# Patient Record
Sex: MALE | Race: WHITE | NOT HISPANIC OR LATINO | Employment: OTHER | URBAN - METROPOLITAN AREA
[De-identification: names, ages, dates, MRNs, and addresses within clinical notes are randomized per-mention and may not be internally consistent; named-entity substitution may affect disease eponyms.]

---

## 2023-09-05 PROBLEM — M79.606 PAIN OF LOWER EXTREMITY: Status: ACTIVE | Noted: 2023-09-05

## 2023-09-05 PROBLEM — G47.30 SLEEP APNEA: Status: ACTIVE | Noted: 2023-09-05

## 2023-09-05 PROBLEM — R60.9 EDEMA: Status: ACTIVE | Noted: 2023-09-05

## 2023-09-05 PROBLEM — R60.0 LOWER LEG EDEMA: Status: ACTIVE | Noted: 2023-09-05

## 2023-09-05 PROBLEM — I10 HYPERTENSION: Status: ACTIVE | Noted: 2023-09-05

## 2023-09-05 PROBLEM — I73.9 PERIPHERAL VASCULAR DISEASE (CMS-HCC): Status: ACTIVE | Noted: 2023-09-05

## 2023-09-05 PROBLEM — I25.10 CORONARY ARTERY DISEASE: Status: ACTIVE | Noted: 2023-09-05

## 2023-09-05 PROBLEM — E78.5 HYPERLIPIDEMIA: Status: ACTIVE | Noted: 2023-09-05

## 2023-09-05 PROBLEM — L03.90 CELLULITIS: Status: ACTIVE | Noted: 2023-09-05

## 2023-09-05 PROBLEM — M19.90 DEGENERATIVE JOINT DISEASE: Status: ACTIVE | Noted: 2023-09-05

## 2023-09-05 PROBLEM — E11.9 DIABETES MELLITUS WITHOUT COMPLICATION (MULTI): Status: ACTIVE | Noted: 2023-09-05

## 2023-09-05 PROBLEM — E11.21 DIABETIC NEPHROPATHY (MULTI): Status: ACTIVE | Noted: 2023-09-05

## 2023-09-05 PROBLEM — E53.8 VITAMIN B12 DEFICIENCY: Status: ACTIVE | Noted: 2023-09-05

## 2023-09-05 PROBLEM — E11.40 DIABETIC NEUROPATHY (MULTI): Status: ACTIVE | Noted: 2023-09-05

## 2023-09-05 PROBLEM — E86.0 DEHYDRATION: Status: ACTIVE | Noted: 2023-09-05

## 2023-09-05 PROBLEM — I63.9 STROKE (MULTI): Status: ACTIVE | Noted: 2023-09-05

## 2023-09-05 PROBLEM — D75.1 POLYCYTHEMIA: Status: ACTIVE | Noted: 2023-09-05

## 2023-09-05 PROBLEM — M10.9 GOUT: Status: ACTIVE | Noted: 2023-09-05

## 2023-09-05 PROBLEM — N40.0 BENIGN PROSTATIC HYPERPLASIA: Status: ACTIVE | Noted: 2023-09-05

## 2023-09-05 PROBLEM — E87.1 HYPONATREMIA: Status: ACTIVE | Noted: 2023-09-05

## 2023-09-05 PROBLEM — F41.9 ANXIETY: Status: ACTIVE | Noted: 2023-09-05

## 2023-09-05 PROBLEM — R41.0 DELIRIUM: Status: ACTIVE | Noted: 2023-09-05

## 2023-09-05 PROBLEM — R40.1 CLOUDED CONSCIOUSNESS: Status: ACTIVE | Noted: 2019-08-02

## 2023-09-05 PROBLEM — M79.89 LEG SWELLING: Status: ACTIVE | Noted: 2023-09-05

## 2023-09-05 PROBLEM — L03.119 CELLULITIS OF LOWER LEG: Status: ACTIVE | Noted: 2023-09-05

## 2023-09-05 PROBLEM — A87.9 VIRAL MENINGITIS (HHS-HCC): Status: ACTIVE | Noted: 2023-09-05

## 2023-09-05 PROBLEM — F41.8 MIXED ANXIETY AND DEPRESSIVE DISORDER: Status: ACTIVE | Noted: 2023-09-05

## 2023-09-05 PROBLEM — E87.20 LACTIC ACIDOSIS: Status: ACTIVE | Noted: 2023-09-05

## 2023-09-05 RX ORDER — GABAPENTIN 300 MG/1
300 CAPSULE ORAL DAILY
COMMUNITY
End: 2023-09-13 | Stop reason: DRUGHIGH

## 2023-09-05 RX ORDER — ALLOPURINOL 300 MG/1
300 TABLET ORAL DAILY
COMMUNITY
End: 2023-12-21 | Stop reason: SDUPTHER

## 2023-09-05 RX ORDER — INSULIN HUMAN 100 [IU]/ML
INJECTION, SUSPENSION SUBCUTANEOUS
COMMUNITY
Start: 2014-12-17

## 2023-09-05 RX ORDER — LISINOPRIL 10 MG/1
10 TABLET ORAL DAILY
COMMUNITY
Start: 2018-12-26 | End: 2023-09-13 | Stop reason: DRUGHIGH

## 2023-09-05 RX ORDER — LANOLIN ALCOHOL/MO/W.PET/CERES
1 CREAM (GRAM) TOPICAL DAILY
COMMUNITY
Start: 2019-12-04 | End: 2023-12-21 | Stop reason: ALTCHOICE

## 2023-09-05 RX ORDER — FUROSEMIDE 20 MG/1
20 TABLET ORAL DAILY
COMMUNITY
Start: 2021-02-01 | End: 2023-12-21 | Stop reason: SDUPTHER

## 2023-09-05 RX ORDER — CLOPIDOGREL BISULFATE 75 MG/1
75 TABLET ORAL DAILY
COMMUNITY

## 2023-09-05 RX ORDER — ASPIRIN 325 MG
325 TABLET ORAL DAILY
COMMUNITY
End: 2023-09-13 | Stop reason: ALTCHOICE

## 2023-09-05 RX ORDER — AMMONIUM LACTATE 12 G/100G
1 LOTION TOPICAL DAILY
COMMUNITY
End: 2023-09-13 | Stop reason: ALTCHOICE

## 2023-09-05 RX ORDER — GABAPENTIN 600 MG/1
600 TABLET ORAL EVERY 8 HOURS
COMMUNITY
End: 2023-12-21 | Stop reason: SDUPTHER

## 2023-09-05 RX ORDER — LISINOPRIL 20 MG/1
20 TABLET ORAL DAILY
COMMUNITY
Start: 2018-12-26 | End: 2023-12-21 | Stop reason: SDUPTHER

## 2023-09-05 RX ORDER — SIMVASTATIN 40 MG/1
40 TABLET, FILM COATED ORAL DAILY
COMMUNITY
Start: 2013-04-17 | End: 2023-12-21 | Stop reason: SDUPTHER

## 2023-09-05 RX ORDER — IBUPROFEN 200 MG
CAPSULE ORAL
COMMUNITY
Start: 2020-12-28 | End: 2023-12-21 | Stop reason: SDUPTHER

## 2023-12-15 ENCOUNTER — LAB (OUTPATIENT)
Dept: LAB | Facility: LAB | Age: 83
End: 2023-12-15
Payer: MEDICARE

## 2023-12-15 DIAGNOSIS — I10 ESSENTIAL (PRIMARY) HYPERTENSION: ICD-10-CM

## 2023-12-15 DIAGNOSIS — E78.5 HYPERLIPIDEMIA, UNSPECIFIED: ICD-10-CM

## 2023-12-15 DIAGNOSIS — M10.9 GOUT, UNSPECIFIED: ICD-10-CM

## 2023-12-15 DIAGNOSIS — E11.40 TYPE 2 DIABETES MELLITUS WITH DIABETIC NEUROPATHY, UNSPECIFIED (MULTI): Primary | ICD-10-CM

## 2023-12-15 LAB
ALBUMIN SERPL-MCNC: 4 G/DL (ref 3.5–5)
ALP BLD-CCNC: 88 U/L (ref 35–125)
ALT SERPL-CCNC: 13 U/L (ref 5–40)
ANION GAP SERPL CALC-SCNC: 12 MMOL/L
AST SERPL-CCNC: 16 U/L (ref 5–40)
BILIRUB SERPL-MCNC: 0.6 MG/DL (ref 0.1–1.2)
BUN SERPL-MCNC: 13 MG/DL (ref 8–25)
CALCIUM SERPL-MCNC: 8.8 MG/DL (ref 8.5–10.4)
CHLORIDE SERPL-SCNC: 100 MMOL/L (ref 97–107)
CHOLEST SERPL-MCNC: 167 MG/DL (ref 133–200)
CHOLEST/HDLC SERPL: 4.8 {RATIO}
CO2 SERPL-SCNC: 28 MMOL/L (ref 24–31)
CREAT SERPL-MCNC: 1.3 MG/DL (ref 0.4–1.6)
ERYTHROCYTE [DISTWIDTH] IN BLOOD BY AUTOMATED COUNT: 13.3 % (ref 11.5–14.5)
EST. AVERAGE GLUCOSE BLD GHB EST-MCNC: 180 MG/DL
GFR SERPL CREATININE-BSD FRML MDRD: 55 ML/MIN/1.73M*2
GLUCOSE SERPL-MCNC: 188 MG/DL (ref 65–99)
HBA1C MFR BLD: 7.9 %
HCT VFR BLD AUTO: 56 % (ref 41–52)
HDLC SERPL-MCNC: 35 MG/DL
HGB BLD-MCNC: 18.1 G/DL (ref 13.5–17.5)
LDLC SERPL CALC-MCNC: 100 MG/DL (ref 65–130)
MCH RBC QN AUTO: 30.7 PG (ref 26–34)
MCHC RBC AUTO-ENTMCNC: 32.3 G/DL (ref 32–36)
MCV RBC AUTO: 95 FL (ref 80–100)
NRBC BLD-RTO: 0 /100 WBCS (ref 0–0)
PLATELET # BLD AUTO: 185 X10*3/UL (ref 150–450)
POTASSIUM SERPL-SCNC: 4.7 MMOL/L (ref 3.4–5.1)
PROT SERPL-MCNC: 6.4 G/DL (ref 5.9–7.9)
RBC # BLD AUTO: 5.89 X10*6/UL (ref 4.5–5.9)
SODIUM SERPL-SCNC: 140 MMOL/L (ref 133–145)
TRIGL SERPL-MCNC: 158 MG/DL (ref 40–150)
URATE SERPL-MCNC: 4.4 MG/DL (ref 3.6–7.7)
WBC # BLD AUTO: 8.5 X10*3/UL (ref 4.4–11.3)

## 2023-12-15 PROCEDURE — 84550 ASSAY OF BLOOD/URIC ACID: CPT

## 2023-12-15 PROCEDURE — 80053 COMPREHEN METABOLIC PANEL: CPT

## 2023-12-15 PROCEDURE — 36415 COLL VENOUS BLD VENIPUNCTURE: CPT

## 2023-12-15 PROCEDURE — 80061 LIPID PANEL: CPT

## 2023-12-15 PROCEDURE — 83036 HEMOGLOBIN GLYCOSYLATED A1C: CPT

## 2023-12-15 PROCEDURE — 85027 COMPLETE CBC AUTOMATED: CPT

## 2023-12-21 ENCOUNTER — OFFICE VISIT (OUTPATIENT)
Dept: PRIMARY CARE | Facility: CLINIC | Age: 83
End: 2023-12-21
Payer: MEDICARE

## 2023-12-21 VITALS
DIASTOLIC BLOOD PRESSURE: 78 MMHG | BODY MASS INDEX: 40.97 KG/M2 | TEMPERATURE: 96.6 F | OXYGEN SATURATION: 97 % | SYSTOLIC BLOOD PRESSURE: 118 MMHG | HEART RATE: 70 BPM | WEIGHT: 250 LBS

## 2023-12-21 DIAGNOSIS — Z86.2 HISTORY OF ANEMIA: ICD-10-CM

## 2023-12-21 DIAGNOSIS — I10 PRIMARY HYPERTENSION: ICD-10-CM

## 2023-12-21 DIAGNOSIS — Z01.89 ENCOUNTER FOR ROUTINE LABORATORY TESTING: ICD-10-CM

## 2023-12-21 DIAGNOSIS — M10.9 GOUT, UNSPECIFIED CAUSE, UNSPECIFIED CHRONICITY, UNSPECIFIED SITE: ICD-10-CM

## 2023-12-21 DIAGNOSIS — M79.89 LEG SWELLING: ICD-10-CM

## 2023-12-21 DIAGNOSIS — E11.39 TYPE 2 DIABETES MELLITUS WITH OTHER OPHTHALMIC COMPLICATION, WITH LONG-TERM CURRENT USE OF INSULIN (MULTI): Primary | ICD-10-CM

## 2023-12-21 DIAGNOSIS — G62.9 NEUROPATHY: ICD-10-CM

## 2023-12-21 DIAGNOSIS — R73.9 HYPERGLYCEMIA: ICD-10-CM

## 2023-12-21 DIAGNOSIS — Z79.4 TYPE 2 DIABETES MELLITUS WITH OTHER OPHTHALMIC COMPLICATION, WITH LONG-TERM CURRENT USE OF INSULIN (MULTI): Primary | ICD-10-CM

## 2023-12-21 DIAGNOSIS — E78.2 MIXED HYPERLIPIDEMIA: ICD-10-CM

## 2023-12-21 PROCEDURE — 3074F SYST BP LT 130 MM HG: CPT | Performed by: NURSE PRACTITIONER

## 2023-12-21 PROCEDURE — 99214 OFFICE O/P EST MOD 30 MIN: CPT | Performed by: NURSE PRACTITIONER

## 2023-12-21 PROCEDURE — 1126F AMNT PAIN NOTED NONE PRSNT: CPT | Performed by: NURSE PRACTITIONER

## 2023-12-21 PROCEDURE — 1159F MED LIST DOCD IN RCRD: CPT | Performed by: NURSE PRACTITIONER

## 2023-12-21 PROCEDURE — 3078F DIAST BP <80 MM HG: CPT | Performed by: NURSE PRACTITIONER

## 2023-12-21 PROCEDURE — 1036F TOBACCO NON-USER: CPT | Performed by: NURSE PRACTITIONER

## 2023-12-21 RX ORDER — IBUPROFEN 200 MG
300 CAPSULE ORAL 3 TIMES DAILY
Qty: 300 EACH | Refills: 3 | Status: SHIPPED | OUTPATIENT
Start: 2023-12-21 | End: 2024-12-20

## 2023-12-21 RX ORDER — LISINOPRIL 20 MG/1
20 TABLET ORAL DAILY
Qty: 90 TABLET | Refills: 3 | Status: SHIPPED | OUTPATIENT
Start: 2023-12-21 | End: 2024-12-20

## 2023-12-21 RX ORDER — ALLOPURINOL 300 MG/1
300 TABLET ORAL DAILY
Qty: 90 TABLET | Refills: 3 | Status: SHIPPED | OUTPATIENT
Start: 2023-12-21 | End: 2024-12-20

## 2023-12-21 RX ORDER — SIMVASTATIN 40 MG/1
40 TABLET, FILM COATED ORAL NIGHTLY
Qty: 90 TABLET | Refills: 3 | Status: SHIPPED | OUTPATIENT
Start: 2023-12-21 | End: 2024-12-20

## 2023-12-21 RX ORDER — GABAPENTIN 600 MG/1
600 TABLET ORAL EVERY 8 HOURS
Qty: 270 TABLET | Refills: 3 | Status: SHIPPED | OUTPATIENT
Start: 2023-12-21 | End: 2024-12-20

## 2023-12-21 RX ORDER — FUROSEMIDE 20 MG/1
20 TABLET ORAL DAILY
Qty: 90 TABLET | Refills: 3 | Status: SHIPPED | OUTPATIENT
Start: 2023-12-21 | End: 2024-12-20

## 2023-12-21 ASSESSMENT — ENCOUNTER SYMPTOMS
NAUSEA: 0
OCCASIONAL FEELINGS OF UNSTEADINESS: 0
FEVER: 0
DIAPHORESIS: 0
NUMBNESS: 1
SHORTNESS OF BREATH: 0
POLYPHAGIA: 0
DEPRESSION: 0
CHEST TIGHTNESS: 0
COUGH: 0
APPETITE CHANGE: 0
CHILLS: 0
VOMITING: 0
BLOOD IN STOOL: 0
LOSS OF SENSATION IN FEET: 0
POLYDIPSIA: 0
ABDOMINAL PAIN: 0
FATIGUE: 0
PALPITATIONS: 0

## 2023-12-21 ASSESSMENT — PAIN SCALES - GENERAL: PAINLEVEL: 0-NO PAIN

## 2023-12-21 ASSESSMENT — PATIENT HEALTH QUESTIONNAIRE - PHQ9
1. LITTLE INTEREST OR PLEASURE IN DOING THINGS: NOT AT ALL
SUM OF ALL RESPONSES TO PHQ9 QUESTIONS 1 AND 2: 0
2. FEELING DOWN, DEPRESSED OR HOPELESS: NOT AT ALL

## 2023-12-21 NOTE — PROGRESS NOTES
[facility]: [facility]  PROGRESS NOTE      Kyle Conteh is a 83 y.o. male that is presenting today for 6 month diabetic follow up.    Assessment/Plan   Diagnoses and all orders for this visit:  Type 2 diabetes mellitus with other ophthalmic complication, with long-term current use of insulin (CMS/[facility])  -     blood sugar diagnostic (Blood Glucose Test) strip; 300 strips by subdermal route 3 times a day. One Touch Verio Test Strips DX: E11.9 uses insulin test 3x/day for 90 days  -     Albumin, urine, random; Future  Primary hypertension  -     lisinopril 20 mg tablet; Take 1 tablet (20 mg) by mouth once daily.  Mixed hyperlipidemia  -     simvastatin (Zocor) 40 mg tablet; Take 1 tablet (40 mg) by mouth once daily at bedtime.  -     Lipid Panel; Future  Gout, unspecified cause, unspecified chronicity, unspecified site  -     allopurinol (Zyloprim) 300 mg tablet; Take 1 tablet (300 mg) by mouth once daily.  -     Uric acid; Future  Neuropathy  -     gabapentin (Neurontin) 600 mg tablet; Take 1 tablet (600 mg) by mouth every 8 hours.  Leg swelling  -     furosemide (Lasix) 20 mg tablet; Take 1 tablet (20 mg) by mouth once daily.  Encounter for routine laboratory testing  -     CBC and Auto Differential; Future  -     Comprehensive Metabolic Panel; Future  -     Lipid Panel; Future  -     Hemoglobin A1C; Future  History of anemia  -     CBC and Auto Differential; Future  Hyperglycemia  -     Hemoglobin A1C; Future  Other orders  -     Follow Up In Primary Care - Medicare Annual; Future  Subjective   Subjective  Kyle Conteh is an 83 y.o. male who presents for follow up of diabetes. Current symptoms include: none. Patient denies foot ulcerations, hyperglycemia, hypoglycemia , increased appetite, nausea, polydipsia, polyuria, vomiting, and weight loss. Evaluation to date has included: fasting blood sugar, fasting lipid panel, hemoglobin A1C, and microalbuminuria. Home sugars: BGs are running   consistent with Hgb A1C. Current treatments: no recent interventions. Last dilated eye exam - up to date.    [unfilled]     Objective  [unfilled]     Laboratory:  Lab Results       Component                Value               Date                       HGBA1C                   7.9 (H)             12/15/2023              Assessment/Plan  Diabetes mellitus Type II, under fair control.  Addressed ADA diet.  Encouraged aerobic exercise.  Discussed foot care.  Reminded to get yearly retinal exam.  Continued insulin: no change to current dose    Subjective  Kyle Conteh is here for follow-up of his hypertension. Home blood pressure readings: not doing. Salt intake and diet: salt not added to cooking.  Associated signs and symptoms: none. Patient denies: chest pain, dyspnea, headache, neck aches, orthopnea, palpitations, and peripheral edema. Use of agents associated with hypertension: none. Medication compliance: taking as prescribed.    Objective  [unfilled]    Lab Review   Lab on 12/15/2023  Hemoglobin A1C            Value: 7.9(%) (H)         Dt: 12/15/2023  Estimated Average Glucose Value: 180(mg/dL)         Dt: 12/15/2023  WBC                       Value: 8.5(x10*3/uL)      Dt: 12/15/2023  nRBC                      Value: 0.0(/100 WBCs)     Dt: 12/15/2023  RBC                       Value: 5.89(x10*6/uL)     Dt: 12/15/2023  Hemoglobin                Value: 18.1(g/dL) (H)     Dt: 12/15/2023  Hematocrit                Value: 56.0(%) (H)        Dt: 12/15/2023  MCV                       Value: 95(fL)             Dt: 12/15/2023  MCH                       Value: 30.7(pg)           Dt: 12/15/2023  MCHC                      Value: 32.3(g/dL)         Dt: 12/15/2023  RDW                       Value: 13.3(%)            Dt: 12/15/2023  Platelets                 Value: 185(x10*3/uL)      Dt: 12/15/2023  Glucose                   Value: 188(mg/dL) (H)     Dt: 12/15/2023  Sodium                    Value: 140(mmol/L)        Dt:  12/15/2023  Potassium                 Value: 4.7(mmol/L)        Dt: 12/15/2023  Chloride                  Value: 100(mmol/L)        Dt: 12/15/2023  Bicarbonate               Value: 28(mmol/L)         Dt: 12/15/2023  Urea Nitrogen             Value: 13(mg/dL)          Dt: 12/15/2023  Creatinine                Value: 1.30(mg/dL)        Dt: 12/15/2023  eGFR                      Value: 55(mL/min/1.73m*2) (L) Dt: 12/15/2023  Calcium                   Value: 8.8(mg/dL)         Dt: 12/15/2023  Albumin                   Value: 4.0(g/dL)          Dt: 12/15/2023  Alkaline Phosphatase      Value: 88(U/L)            Dt: 12/15/2023  Total Protein             Value: 6.4(g/dL)          Dt: 12/15/2023  AST                       Value: 16(U/L)            Dt: 12/15/2023  Bilirubin, Total          Value: 0.6(mg/dL)         Dt: 12/15/2023  ALT                       Value: 13(U/L)            Dt: 12/15/2023  Anion Gap                 Value: 12(mmol/L)         Dt: 12/15/2023  Cholesterol               Value: 167(mg/dL)         Dt: 12/15/2023  HDL-Cholesterol           Value: 35.0(mg/dL) (L)    Dt: 12/15/2023  Cholesterol/HDL Ratio     Value: 4.8                Dt: 12/15/2023  LDL Calculated            Value: 100(mg/dL)         Dt: 12/15/2023  Triglycerides             Value: 158(mg/dL) (H)     Dt: 12/15/2023  Uric Acid                 Value: 4.4(mg/dL)         Dt: 12/15/2023  ------------    Assessment/Plan  Hypertension, normal blood pressure.     Medication: no change.  Dietary sodium restriction.  Regular aerobic exercise.  Follow up: 6 months and as needed.    Shannan  Kyle Conteh is here for follow up of dyslipidemia. Compliance with treatment has been good. The patient exercises never. Patient denies muscle pain associated with his medications.    [unfilled]    Objective  [unfilled]    Lab Review  Lab Results       Component                Value               Date                       CHOL                     167                  12/15/2023                 CHOL                     139                 06/12/2023                 CHOL                     139                 11/28/2022                 CHOL                     143                 05/10/2022                 TRIG                     158 (H)             12/15/2023                 TRIG                     123                 06/12/2023                 TRIG                     143                 11/28/2022                 TRIG                     147                 05/10/2022                 HDL                      35.0 (L)            12/15/2023                 HDL                      30 (L)              06/12/2023                 HDL                      32 (L)              11/28/2022                 HDL                      34 (L)              05/10/2022               Assessment/Plan  Dyslipidemia under good control.     1. Continue dietary measures.   2. Continue regular exercise.   3. Lipid-lowering medications: Simvastatin 40 mg QHS.    Shannan Conteh is a 83 y.o. male who presents today in follow-up for gout. The patient reports no acute gout attacks since last clinic visit. Attacks occur primarily in the left first MTP joint. Patient reports his chronic pain is resolved, his joint stiffness is unchanged and his joint swelling is stable. Limitations on activities include none.     Objective  /78 (BP Location: Right arm, Patient Position: Sitting, BP Cuff Size: Adult)   Pulse 70   Temp 35.9 °C (96.6 °F) (Temporal)   Wt 113 kg (250 lb)   SpO2 97%   BMI 40.97 kg/m²   General: alert and oriented, in no acute distress  Joint Findings: no joint abnormalities identified.  Skin findings: no tophi or subcutaneous nodules noted  Lab Results       Component                Value               Date                       URICACID                 4.4                 12/15/2023              Assessment/Plan  Diagnoses and associated orders for this visit:    · Type 2  diabetes mellitus with other ophthalmic complication, with long-term current use of insulin (CMS/[facility])    · Primary hypertension    · Mixed hyperlipidemia    · Gout, unspecified cause, unspecified chronicity, unspecified site                Review of Systems   Constitutional:  Negative for appetite change, chills, diaphoresis, fatigue and fever.   Respiratory:  Negative for cough, chest tightness and shortness of breath.    Cardiovascular:  Negative for chest pain, palpitations and leg swelling.   Gastrointestinal:  Negative for abdominal pain, blood in stool, nausea and vomiting.   Endocrine: Negative for cold intolerance, heat intolerance, polydipsia, polyphagia and polyuria.   Neurological:  Positive for numbness (bilateral feet).      Objective   Vitals:    12/21/23 0915   BP: 118/78   Pulse: 70   Temp: 35.9 °C (96.6 °F)   SpO2: 97%      Body mass index is 40.97 kg/m².  Physical Exam  Constitutional:       General: He is not in acute distress.  Neck:      Vascular: No carotid bruit.   Cardiovascular:      Rate and Rhythm: Normal rate and regular rhythm.      Heart sounds: Normal heart sounds.   Pulmonary:      Effort: Pulmonary effort is normal.      Breath sounds: Normal breath sounds.   Musculoskeletal:         General: No swelling.   Neurological:      Mental Status: He is alert. Mental status is at baseline.   Psychiatric:         Mood and Affect: Mood normal.       Diagnostic Results   Lab Results   Component Value Date    GLUCOSE 188 (H) 12/15/2023    CALCIUM 8.8 12/15/2023     12/15/2023    K 4.7 12/15/2023    CO2 28 12/15/2023     12/15/2023    BUN 13 12/15/2023    CREATININE 1.30 12/15/2023     Lab Results   Component Value Date    ALT 13 12/15/2023    AST 16 12/15/2023    ALKPHOS 88 12/15/2023    BILITOT 0.6 12/15/2023     Lab Results   Component Value Date    WBC 8.5 12/15/2023    HGB 18.1 (H) 12/15/2023    HCT 56.0 (H) 12/15/2023    MCV 95 12/15/2023     12/15/2023     Lab Results  "  Component Value Date    CHOL 167 12/15/2023    CHOL 139 06/12/2023    CHOL 139 11/28/2022     Lab Results   Component Value Date    HDL 35.0 (L) 12/15/2023    HDL 30 (L) 06/12/2023    HDL 32 (L) 11/28/2022     Lab Results   Component Value Date    LDLCALC 100 12/15/2023    LDLCALC 84 06/12/2023    LDLCALC 78 11/28/2022     Lab Results   Component Value Date    TRIG 158 (H) 12/15/2023    TRIG 123 06/12/2023    TRIG 143 11/28/2022     No components found for: \"CHOLHDL\"  Lab Results   Component Value Date    HGBA1C 7.9 (H) 12/15/2023     Other labs not included in the list above were reviewed either before or during this encounter.    History    History reviewed. No pertinent past medical history.  History reviewed. No pertinent surgical history.  Family History   Problem Relation Name Age of Onset    Diabetes Mother      Lung cancer Mother      Diabetes Father      Other (OHD) Father      Diabetes Brother       Social History     Socioeconomic History    Marital status:      Spouse name: Not on file    Number of children: Not on file    Years of education: Not on file    Highest education level: Not on file   Occupational History    Not on file   Tobacco Use    Smoking status: Never    Smokeless tobacco: Never   Substance and Sexual Activity    Alcohol use: Never    Drug use: Never    Sexual activity: Not on file   Other Topics Concern    Not on file   Social History Narrative    Not on file     Social Determinants of Health     Financial Resource Strain: Not on file   Food Insecurity: Not on file   Transportation Needs: Not on file   Physical Activity: Not on file   Stress: Not on file   Social Connections: Not on file   Intimate Partner Violence: Not on file   Housing Stability: Not on file     No Known Allergies  Current Outpatient Medications on File Prior to Visit   Medication Sig Dispense Refill    allopurinol (Zyloprim) 300 mg tablet Take 1 tablet (300 mg) by mouth once daily.      blood sugar " diagnostic (Blood Glucose Test) strip One Touch Verio Test Strips DX: E11.9 uses insulin test 3x/day for 90 days      furosemide (Lasix) 20 mg tablet Take 1 tablet (20 mg) by mouth once daily.      gabapentin (Neurontin) 600 mg tablet Take 1 tablet (600 mg) by mouth every 8 hours.      insulin NPH and regular human (HumuLIN 70/30 U-100 Insulin) 100 unit/mL (70-30) injection 40/20 Subcutaneous 40 units w/ breakfast; 20 units before dinner      insulin syringe-needle U-100 1 mL 29 gauge syringe Inject 80 Units under the skin 2 times a day as needed.      lisinopril 20 mg tablet Take 1 tablet (20 mg) by mouth once daily.      simvastatin (Zocor) 40 mg tablet Take 1 tablet (40 mg) by mouth once daily.      clopidogrel (Plavix) 75 mg tablet Take 1 tablet (75 mg) by mouth once daily.      [DISCONTINUED] cyanocobalamin (Vitamin B-12) 1,000 mcg tablet Take 1 tablet (1,000 mcg) by mouth once daily.       No current facility-administered medications on file prior to visit.     Immunization History   Administered Date(s) Administered    Influenza, Unspecified 12/14/2016    Moderna SARS-CoV-2 Vaccination 04/07/2021, 05/07/2021, 11/24/2021     Patient's medical history was reviewed and updated either before or during this encounter.       Dana Costello, APRN-CNP

## 2024-06-14 ENCOUNTER — LAB (OUTPATIENT)
Dept: LAB | Facility: LAB | Age: 84
End: 2024-06-14
Payer: MEDICARE

## 2024-06-14 DIAGNOSIS — E78.2 MIXED HYPERLIPIDEMIA: ICD-10-CM

## 2024-06-14 DIAGNOSIS — M10.9 GOUT, UNSPECIFIED CAUSE, UNSPECIFIED CHRONICITY, UNSPECIFIED SITE: ICD-10-CM

## 2024-06-14 DIAGNOSIS — Z86.2 HISTORY OF ANEMIA: ICD-10-CM

## 2024-06-14 DIAGNOSIS — R73.9 HYPERGLYCEMIA: ICD-10-CM

## 2024-06-14 DIAGNOSIS — Z79.4 TYPE 2 DIABETES MELLITUS WITH OTHER OPHTHALMIC COMPLICATION, WITH LONG-TERM CURRENT USE OF INSULIN (MULTI): ICD-10-CM

## 2024-06-14 DIAGNOSIS — Z01.89 ENCOUNTER FOR ROUTINE LABORATORY TESTING: ICD-10-CM

## 2024-06-14 DIAGNOSIS — E11.39 TYPE 2 DIABETES MELLITUS WITH OTHER OPHTHALMIC COMPLICATION, WITH LONG-TERM CURRENT USE OF INSULIN (MULTI): ICD-10-CM

## 2024-06-14 LAB
ALBUMIN SERPL-MCNC: 4 G/DL (ref 3.5–5)
ALP BLD-CCNC: 86 U/L (ref 35–125)
ALT SERPL-CCNC: 11 U/L (ref 5–40)
ANION GAP SERPL CALC-SCNC: 14 MMOL/L
AST SERPL-CCNC: 16 U/L (ref 5–40)
BASOPHILS # BLD AUTO: 0.03 X10*3/UL (ref 0–0.1)
BASOPHILS NFR BLD AUTO: 0.4 %
BILIRUB SERPL-MCNC: 0.8 MG/DL (ref 0.1–1.2)
BUN SERPL-MCNC: 19 MG/DL (ref 8–25)
CALCIUM SERPL-MCNC: 8.5 MG/DL (ref 8.5–10.4)
CHLORIDE SERPL-SCNC: 100 MMOL/L (ref 97–107)
CHOLEST SERPL-MCNC: 156 MG/DL (ref 133–200)
CHOLEST/HDLC SERPL: 4.3 {RATIO}
CO2 SERPL-SCNC: 26 MMOL/L (ref 24–31)
CREAT SERPL-MCNC: 1.3 MG/DL (ref 0.4–1.6)
CREAT UR-MCNC: 201.4 MG/DL
EGFRCR SERPLBLD CKD-EPI 2021: 55 ML/MIN/1.73M*2
EOSINOPHIL # BLD AUTO: 0.16 X10*3/UL (ref 0–0.4)
EOSINOPHIL NFR BLD AUTO: 2 %
ERYTHROCYTE [DISTWIDTH] IN BLOOD BY AUTOMATED COUNT: 13.3 % (ref 11.5–14.5)
EST. AVERAGE GLUCOSE BLD GHB EST-MCNC: 177 MG/DL
GLUCOSE SERPL-MCNC: 154 MG/DL (ref 65–99)
HBA1C MFR BLD: 7.8 %
HCT VFR BLD AUTO: 53.1 % (ref 41–52)
HDLC SERPL-MCNC: 36 MG/DL
HGB BLD-MCNC: 16.9 G/DL (ref 13.5–17.5)
IMM GRANULOCYTES # BLD AUTO: 0.02 X10*3/UL (ref 0–0.5)
IMM GRANULOCYTES NFR BLD AUTO: 0.3 % (ref 0–0.9)
LDLC SERPL CALC-MCNC: 99 MG/DL (ref 65–130)
LYMPHOCYTES # BLD AUTO: 1.67 X10*3/UL (ref 0.8–3)
LYMPHOCYTES NFR BLD AUTO: 21.1 %
MCH RBC QN AUTO: 30.6 PG (ref 26–34)
MCHC RBC AUTO-ENTMCNC: 31.8 G/DL (ref 32–36)
MCV RBC AUTO: 96 FL (ref 80–100)
MICROALBUMIN UR-MCNC: 129 MG/L (ref 0–23)
MICROALBUMIN/CREAT UR: 64.1 UG/MG CREAT
MONOCYTES # BLD AUTO: 0.58 X10*3/UL (ref 0.05–0.8)
MONOCYTES NFR BLD AUTO: 7.3 %
NEUTROPHILS # BLD AUTO: 5.45 X10*3/UL (ref 1.6–5.5)
NEUTROPHILS NFR BLD AUTO: 68.9 %
NRBC BLD-RTO: 0 /100 WBCS (ref 0–0)
PLATELET # BLD AUTO: 169 X10*3/UL (ref 150–450)
POTASSIUM SERPL-SCNC: 4.2 MMOL/L (ref 3.4–5.1)
PROT SERPL-MCNC: 6.7 G/DL (ref 5.9–7.9)
RBC # BLD AUTO: 5.53 X10*6/UL (ref 4.5–5.9)
SODIUM SERPL-SCNC: 140 MMOL/L (ref 133–145)
TRIGL SERPL-MCNC: 104 MG/DL (ref 40–150)
URATE SERPL-MCNC: 5.3 MG/DL (ref 3.6–7.7)
WBC # BLD AUTO: 7.9 X10*3/UL (ref 4.4–11.3)

## 2024-06-14 PROCEDURE — 80053 COMPREHEN METABOLIC PANEL: CPT

## 2024-06-14 PROCEDURE — 82570 ASSAY OF URINE CREATININE: CPT

## 2024-06-14 PROCEDURE — 80061 LIPID PANEL: CPT

## 2024-06-14 PROCEDURE — 82043 UR ALBUMIN QUANTITATIVE: CPT

## 2024-06-14 PROCEDURE — 36415 COLL VENOUS BLD VENIPUNCTURE: CPT

## 2024-06-14 PROCEDURE — 84550 ASSAY OF BLOOD/URIC ACID: CPT

## 2024-06-14 PROCEDURE — 83036 HEMOGLOBIN GLYCOSYLATED A1C: CPT

## 2024-06-14 PROCEDURE — 85025 COMPLETE CBC W/AUTO DIFF WBC: CPT

## 2024-06-20 ENCOUNTER — OFFICE VISIT (OUTPATIENT)
Dept: PRIMARY CARE | Facility: CLINIC | Age: 84
End: 2024-06-20
Payer: MEDICARE

## 2024-06-20 VITALS
SYSTOLIC BLOOD PRESSURE: 120 MMHG | HEIGHT: 66 IN | WEIGHT: 255 LBS | BODY MASS INDEX: 40.98 KG/M2 | TEMPERATURE: 98.4 F | DIASTOLIC BLOOD PRESSURE: 70 MMHG | HEART RATE: 75 BPM | OXYGEN SATURATION: 95 %

## 2024-06-20 DIAGNOSIS — I25.10 CORONARY ARTERY DISEASE WITHOUT ANGINA PECTORIS, UNSPECIFIED VESSEL OR LESION TYPE, UNSPECIFIED WHETHER NATIVE OR TRANSPLANTED HEART: ICD-10-CM

## 2024-06-20 DIAGNOSIS — I10 PRIMARY HYPERTENSION: ICD-10-CM

## 2024-06-20 DIAGNOSIS — E11.39 TYPE 2 DIABETES MELLITUS WITH OTHER OPHTHALMIC COMPLICATION, WITH LONG-TERM CURRENT USE OF INSULIN (MULTI): Primary | ICD-10-CM

## 2024-06-20 DIAGNOSIS — M10.9 GOUT, UNSPECIFIED CAUSE, UNSPECIFIED CHRONICITY, UNSPECIFIED SITE: ICD-10-CM

## 2024-06-20 DIAGNOSIS — E11.42 DIABETIC POLYNEUROPATHY ASSOCIATED WITH TYPE 2 DIABETES MELLITUS (MULTI): ICD-10-CM

## 2024-06-20 DIAGNOSIS — N18.31 STAGE 3A CHRONIC KIDNEY DISEASE (MULTI): ICD-10-CM

## 2024-06-20 DIAGNOSIS — Z79.4 TYPE 2 DIABETES MELLITUS WITH OTHER OPHTHALMIC COMPLICATION, WITH LONG-TERM CURRENT USE OF INSULIN (MULTI): Primary | ICD-10-CM

## 2024-06-20 DIAGNOSIS — E11.9 DIABETES MELLITUS WITHOUT COMPLICATION (MULTI): Primary | ICD-10-CM

## 2024-06-20 DIAGNOSIS — E53.8 VITAMIN B12 DEFICIENCY: ICD-10-CM

## 2024-06-20 DIAGNOSIS — R60.0 LOWER LEG EDEMA: ICD-10-CM

## 2024-06-20 DIAGNOSIS — Z86.2 HISTORY OF ANEMIA: ICD-10-CM

## 2024-06-20 DIAGNOSIS — E78.2 MIXED HYPERLIPIDEMIA: ICD-10-CM

## 2024-06-20 PROCEDURE — 99214 OFFICE O/P EST MOD 30 MIN: CPT | Performed by: NURSE PRACTITIONER

## 2024-06-20 PROCEDURE — 1159F MED LIST DOCD IN RCRD: CPT | Performed by: NURSE PRACTITIONER

## 2024-06-20 PROCEDURE — 1126F AMNT PAIN NOTED NONE PRSNT: CPT | Performed by: NURSE PRACTITIONER

## 2024-06-20 PROCEDURE — 1158F ADVNC CARE PLAN TLK DOCD: CPT | Performed by: NURSE PRACTITIONER

## 2024-06-20 PROCEDURE — 3074F SYST BP LT 130 MM HG: CPT | Performed by: NURSE PRACTITIONER

## 2024-06-20 PROCEDURE — 3078F DIAST BP <80 MM HG: CPT | Performed by: NURSE PRACTITIONER

## 2024-06-20 PROCEDURE — 1160F RVW MEDS BY RX/DR IN RCRD: CPT | Performed by: NURSE PRACTITIONER

## 2024-06-20 PROCEDURE — 1123F ACP DISCUSS/DSCN MKR DOCD: CPT | Performed by: NURSE PRACTITIONER

## 2024-06-20 PROCEDURE — 1036F TOBACCO NON-USER: CPT | Performed by: NURSE PRACTITIONER

## 2024-06-20 RX ORDER — SIMVASTATIN 80 MG/1
80 TABLET, FILM COATED ORAL NIGHTLY
Qty: 90 TABLET | Refills: 3 | Status: SHIPPED | OUTPATIENT
Start: 2024-06-20 | End: 2025-06-20

## 2024-06-20 RX ORDER — BLOOD-GLUCOSE SENSOR
EACH MISCELLANEOUS
Qty: 2 EACH | Refills: 11 | Status: SHIPPED | OUTPATIENT
Start: 2024-06-20

## 2024-06-20 RX ORDER — BLOOD-GLUCOSE,RECEIVER,CONT
EACH MISCELLANEOUS
Qty: 1 EACH | Refills: 0 | Status: SHIPPED | OUTPATIENT
Start: 2024-06-20

## 2024-06-20 ASSESSMENT — ENCOUNTER SYMPTOMS
SHORTNESS OF BREATH: 0
BLOOD IN STOOL: 0
FLANK PAIN: 0
FACIAL ASYMMETRY: 0
COUGH: 0
ABDOMINAL PAIN: 0
NAUSEA: 0
HEMATURIA: 0
DEPRESSION: 0
LOSS OF SENSATION IN FEET: 0
HEADACHES: 0
DIAPHORESIS: 0
SEIZURES: 0
CHEST TIGHTNESS: 0
DYSURIA: 0
VOMITING: 0
SPEECH DIFFICULTY: 0
PALPITATIONS: 0
CHILLS: 0
BACK PAIN: 0
DIZZINESS: 0
POLYPHAGIA: 0
POLYDIPSIA: 0
FEVER: 0
OCCASIONAL FEELINGS OF UNSTEADINESS: 1
NECK PAIN: 0
FATIGUE: 0
NUMBNESS: 1
AGITATION: 0
CONFUSION: 0

## 2024-06-20 ASSESSMENT — LIFESTYLE VARIABLES
HAS A RELATIVE, FRIEND, DOCTOR, OR ANOTHER HEALTH PROFESSIONAL EXPRESSED CONCERN ABOUT YOUR DRINKING OR SUGGESTED YOU CUT DOWN: NO
AUDIT TOTAL SCORE: 0
AUDIT-C TOTAL SCORE: 0
HOW OFTEN DURING THE LAST YEAR HAVE YOU NEEDED AN ALCOHOLIC DRINK FIRST THING IN THE MORNING TO GET YOURSELF GOING AFTER A NIGHT OF HEAVY DRINKING: NEVER
HOW OFTEN DO YOU HAVE A DRINK CONTAINING ALCOHOL: NEVER
HAVE YOU OR SOMEONE ELSE BEEN INJURED AS A RESULT OF YOUR DRINKING: NO
HOW OFTEN DURING THE LAST YEAR HAVE YOU HAD A FEELING OF GUILT OR REMORSE AFTER DRINKING: NEVER
HOW OFTEN DURING THE LAST YEAR HAVE YOU FOUND THAT YOU WERE NOT ABLE TO STOP DRINKING ONCE YOU HAD STARTED: NEVER
HOW OFTEN DURING THE LAST YEAR HAVE YOU BEEN UNABLE TO REMEMBER WHAT HAPPENED THE NIGHT BEFORE BECAUSE YOU HAD BEEN DRINKING: NEVER
SKIP TO QUESTIONS 9-10: 1
HOW MANY STANDARD DRINKS CONTAINING ALCOHOL DO YOU HAVE ON A TYPICAL DAY: PATIENT DOES NOT DRINK
HOW OFTEN DURING THE LAST YEAR HAVE YOU FAILED TO DO WHAT WAS NORMALLY EXPECTED FROM YOU BECAUSE OF DRINKING: NEVER
HOW OFTEN DO YOU HAVE SIX OR MORE DRINKS ON ONE OCCASION: NEVER

## 2024-06-20 ASSESSMENT — PATIENT HEALTH QUESTIONNAIRE - PHQ9
2. FEELING DOWN, DEPRESSED OR HOPELESS: NOT AT ALL
SUM OF ALL RESPONSES TO PHQ9 QUESTIONS 1 AND 2: 0
1. LITTLE INTEREST OR PLEASURE IN DOING THINGS: NOT AT ALL

## 2024-06-20 ASSESSMENT — PAIN SCALES - GENERAL: PAINLEVEL: 0-NO PAIN

## 2024-06-20 NOTE — TELEPHONE ENCOUNTER
PA required, unsure price until sent to pharmacy and PA is processed. See pending order (please send to patient's preferred pharmacy)

## 2024-06-20 NOTE — PROGRESS NOTES
[facility]: [facility]  MEDICARE WELLNESS EXAM      Kyle Conteh is a 83 y.o. male that is presenting today for Medicare Annual Wellness Exam.    Diabetes is managed with Insulin. Patient is monitoring home BG levels 2-3x/day. He is trying to follow a low sugar and carbohydrate diet. Denies hypo/hyperglycemia, polydipsia, polyuria or polyphagia. Most recent A1c 7.8.  Urine albumin abnormal with GFR 55 - assure followed by Nephrology.  Requesting CGM.    Hypertension managed with Lisinopril. He is not monitoring home BP. Trying to follow a low sodium diet. Denies CP, SOB, HA, dizziness, palpitations.    Tolerating statin. Denies statin related abdominal pain, myalgias or arthralgias. Lipid panel with LDL 99. If able to tolerate, will increase to meet target of <70.    Lower extremity edema managed with furosemide daily.  He does not wear compression stockings. Denies worsening symptoms.  He was followed by vascular, Dr. Olivas, has been referred to new vascular specialist - has yet to make appointment - encouraged to do so.    Denies gout flare since las visit. Reports taking allopurinol daily as directed.  Uric acid WNL.    Gabapentin take 3x daily to manage neuropathy effectively.      Assessment/Plan    Diagnoses and all orders for this visit:    Diabetes mellitus without complication (Multi)  -     Stable A1c. Referred to pharmacist Remberto Leger, to see if CGM is an option.  -     Continue Insulin regimen  -     Referral to Nephrology; Future - Dr. Collier    Primary hypertension        -     Excellent control        -     Continue Lisinopril 20 mg daily    Mixed hyperlipidemia  -     Tolerating statin. Need to get LDL at target  -     Increase simvastatin (Zocor) 80 mg tablet; Take 1 tablet (80 mg) by mouth once daily at bedtime.    Gout, unspecified cause, unspecified chronicity, unspecified site       -     Stable with no recent gout flares       -     Continue Allopurinol 300 mg  daily       Diabetic polyneuropathy associated with type 2 diabetes mellitus (Multi)        -     Stable        -     Continue gabapentin 600 mg 3x/day     Lower leg edema        -     Stable, needs to establish with new vascular specialist        -     Continue Furosemide 20 mg daily    Coronary artery disease without angina pectoris, unspecified vessel or lesion type, unspecified whether native or transplanted heart        -     Stable        -     Continue Clopidogrel 75 mg daily    Stage 3a chronic kidney disease (Multi)  -     Referral to Nephrology; Future - Dr. Collier    Other orders  -     Follow Up In Primary Care - Medicare Annual  -     Follow Up In Primary Care - Health Maintenance; Future    ADVANCED CARE PLANNING  Advanced Care Planning was discussed with patient:  The patient has an active surrogate decision-maker on file. The patient does not have an advanced care plan on file.  Encouraged the patient to confirm that Living Will and Healthcare Power of  (HCPoA) are accurate and up to date.  Encouraged the patient to confirm that our office be provided a copy of any documentation in the event that anything changes.    ACTIVITIES OF DAILY LIVING  Basic ADLs:  Bathing: Independent, Dressing: Independent, Toileting: Independent, Transferring: Independent, Continence: Independent, Feeding: Independent.    Instrumental ADLs:  Ability to use phone: Independent, Shopping: Independent, Cooking: Independent, House-keeping: Independent, Laundry: Independent, Transportation: Completely Dependent, Medication Management: Independent, Finance Management: Independent.    Subjective   HPI  Review of Systems   Constitutional:  Negative for chills, diaphoresis, fatigue and fever.   HENT:  Negative for hearing loss and mouth sores.    Eyes:  Negative for visual disturbance.   Respiratory:  Negative for cough, chest tightness and shortness of breath.    Cardiovascular:  Positive for leg swelling. Negative for  chest pain and palpitations.   Gastrointestinal:  Negative for abdominal pain, blood in stool, nausea and vomiting.   Endocrine: Negative for cold intolerance, heat intolerance, polydipsia and polyphagia.   Genitourinary:  Negative for dysuria, flank pain and hematuria.   Musculoskeletal:  Negative for back pain and neck pain.   Skin:  Negative for pallor and rash.   Allergic/Immunologic: Negative for environmental allergies, food allergies and immunocompromised state.   Neurological:  Positive for numbness (left hand, bilateral feet). Negative for dizziness, seizures, syncope, facial asymmetry, speech difficulty and headaches.   Psychiatric/Behavioral:  Negative for agitation and confusion.      Objective   Vitals:    06/20/24 0915   BP: 120/70   Pulse: 75   Temp: 36.9 °C (98.4 °F)   SpO2: 95%      Body mass index is 41.79 kg/m².  Physical Exam  Vitals and nursing note reviewed.   Constitutional:       General: He is not in acute distress.     Appearance: Normal appearance. He is not ill-appearing.   HENT:      Head: Normocephalic and atraumatic.      Right Ear: Tympanic membrane, ear canal and external ear normal. There is no impacted cerumen.      Left Ear: Tympanic membrane, ear canal and external ear normal. There is no impacted cerumen.      Nose: Nose normal.      Mouth/Throat:      Mouth: Mucous membranes are moist.      Pharynx: Oropharynx is clear. No oropharyngeal exudate or posterior oropharyngeal erythema.   Eyes:      General: No scleral icterus.        Right eye: No discharge.         Left eye: No discharge.      Extraocular Movements: Extraocular movements intact.      Conjunctiva/sclera: Conjunctivae normal.      Pupils: Pupils are equal, round, and reactive to light.   Neck:      Vascular: No carotid bruit.   Cardiovascular:      Rate and Rhythm: Normal rate and regular rhythm.      Pulses: Normal pulses.           Dorsalis pedis pulses are 2+ on the right side and 2+ on the left side.      Heart  sounds: Normal heart sounds. No murmur heard.  Pulmonary:      Effort: Pulmonary effort is normal. No respiratory distress.      Breath sounds: Normal breath sounds.   Abdominal:      General: Abdomen is flat. Bowel sounds are normal. There is no distension.      Palpations: Abdomen is soft. There is no mass.      Tenderness: There is no abdominal tenderness. There is no right CVA tenderness or left CVA tenderness.      Hernia: No hernia is present.   Musculoskeletal:         General: Normal range of motion.      Cervical back: Normal range of motion.      Right lower leg: Edema (2+ pitting and weeping pretibial to pedal) present.      Left lower leg: Edema (2+ pitting and weeping pretibial to pedal) present.   Feet:      Right foot:      Protective Sensation: 7 sites tested.  7 sites sensed.      Skin integrity: Skin integrity normal.      Toenail Condition: Right toenails are normal.      Left foot:      Protective Sensation: 7 sites tested.  7 sites sensed.      Skin integrity: Skin integrity normal.      Toenail Condition: Left toenails are normal.      Comments: Decreased monofilament bilaterally  Lymphadenopathy:      Cervical: No cervical adenopathy.   Skin:     General: Skin is warm and dry.      Coloration: Skin is not jaundiced.      Findings: No rash.   Neurological:      General: No focal deficit present.      Mental Status: He is alert and oriented to person, place, and time. Mental status is at baseline.   Psychiatric:         Mood and Affect: Mood normal.         Behavior: Behavior normal.       Diagnostic Results   Lab Results   Component Value Date    GLUCOSE 154 (H) 06/14/2024    CALCIUM 8.5 06/14/2024     06/14/2024    K 4.2 06/14/2024    CO2 26 06/14/2024     06/14/2024    BUN 19 06/14/2024    CREATININE 1.30 06/14/2024     Lab Results   Component Value Date    ALT 11 06/14/2024    AST 16 06/14/2024    ALKPHOS 86 06/14/2024    BILITOT 0.8 06/14/2024     Lab Results   Component Value  "Date    WBC 7.9 06/14/2024    HGB 16.9 06/14/2024    HCT 53.1 (H) 06/14/2024    MCV 96 06/14/2024     06/14/2024     Lab Results   Component Value Date    CHOL 156 06/14/2024    CHOL 167 12/15/2023    CHOL 139 06/12/2023     Lab Results   Component Value Date    HDL 36.0 (L) 06/14/2024    HDL 35.0 (L) 12/15/2023    HDL 30 (L) 06/12/2023     Lab Results   Component Value Date    LDLCALC 99 06/14/2024    LDLCALC 100 12/15/2023    LDLCALC 84 06/12/2023     Lab Results   Component Value Date    TRIG 104 06/14/2024    TRIG 158 (H) 12/15/2023    TRIG 123 06/12/2023     No components found for: \"CHOLHDL\"  Lab Results   Component Value Date    HGBA1C 7.8 (H) 06/14/2024     Other labs not included in the list above reviewed either before or during this encounter.    History   History reviewed. No pertinent past medical history.  History reviewed. No pertinent surgical history.  Family History   Problem Relation Name Age of Onset    Diabetes Mother      Lung cancer Mother      Diabetes Father      Other (OHD) Father      Diabetes Brother       Social History     Socioeconomic History    Marital status:      Spouse name: Not on file    Number of children: Not on file    Years of education: Not on file    Highest education level: Not on file   Occupational History    Not on file   Tobacco Use    Smoking status: Never    Smokeless tobacco: Never   Substance and Sexual Activity    Alcohol use: Never    Drug use: Never    Sexual activity: Not on file   Other Topics Concern    Not on file   Social History Narrative    Not on file     Social Determinants of Health     Financial Resource Strain: Not on file   Food Insecurity: Not on file   Transportation Needs: Not on file   Physical Activity: Not on file   Stress: Not on file   Social Connections: Not on file   Intimate Partner Violence: Not on file   Housing Stability: Not on file     No Known Allergies  Current Outpatient Medications on File Prior to Visit "   Medication Sig Dispense Refill    allopurinol (Zyloprim) 300 mg tablet Take 1 tablet (300 mg) by mouth once daily. 90 tablet 3    blood sugar diagnostic (Blood Glucose Test) strip 300 strips by subdermal route 3 times a day. One Touch Verio Test Strips DX: E11.9 uses insulin test 3x/day for 90 days 300 each 3    clopidogrel (Plavix) 75 mg tablet Take 1 tablet (75 mg) by mouth once daily.      furosemide (Lasix) 20 mg tablet Take 1 tablet (20 mg) by mouth once daily. 90 tablet 3    gabapentin (Neurontin) 600 mg tablet Take 1 tablet (600 mg) by mouth every 8 hours. 270 tablet 3    insulin NPH and regular human (HumuLIN 70/30 U-100 Insulin) 100 unit/mL (70-30) injection 40/20 Subcutaneous 40 units w/ breakfast; 20 units before dinner      insulin syringe-needle U-100 1 mL 29 gauge syringe Inject 80 Units under the skin 2 times a day as needed.      lisinopril 20 mg tablet Take 1 tablet (20 mg) by mouth once daily. 90 tablet 3    simvastatin (Zocor) 40 mg tablet Take 1 tablet (40 mg) by mouth once daily at bedtime. 90 tablet 3     No current facility-administered medications on file prior to visit.     Immunization History   Administered Date(s) Administered    Influenza, Unspecified 12/14/2016    Moderna SARS-CoV-2 Vaccination 04/07/2021, 05/07/2021, 11/24/2021     Patient's medical history was reviewed and updated either before or during this encounter.     Dana Costello, APRN-CNP

## 2024-06-21 ENCOUNTER — TELEPHONE (OUTPATIENT)
Dept: PRIMARY CARE | Facility: CLINIC | Age: 84
End: 2024-06-21
Payer: MEDICARE

## 2024-06-25 ENCOUNTER — TELEPHONE (OUTPATIENT)
Dept: PRIMARY CARE | Facility: CLINIC | Age: 84
End: 2024-06-25
Payer: MEDICARE

## 2024-06-25 NOTE — TELEPHONE ENCOUNTER
Pt informed that CGM was approved by insurance he will be contacted by pharmacy when ready for , pt instructed to continue testing glucose as usual for now. Spoke with pt's wife advised her to schedule CGE instruction appt with Kelsie Leger after they receive CGM from pharmacy.

## 2024-06-28 ENCOUNTER — TELEPHONE (OUTPATIENT)
Dept: PRIMARY CARE | Facility: CLINIC | Age: 84
End: 2024-06-28
Payer: MEDICARE

## 2024-07-01 ENCOUNTER — CLINICAL SUPPORT (OUTPATIENT)
Dept: PRIMARY CARE | Facility: CLINIC | Age: 84
End: 2024-07-01
Payer: MEDICARE

## 2024-07-01 DIAGNOSIS — E11.9 DIABETES MELLITUS WITHOUT COMPLICATION (MULTI): Primary | ICD-10-CM

## 2024-07-01 NOTE — PROGRESS NOTES
Patient received the following instructions for Maycol personal start:     Sensor application and start up of sensor; aware to change in 14 days. Products for better adhesion; skin tac and simpatch. Expected differences in sensor glucose and blood glucose; always check with meter if symptoms do not match sensor glucose of if magnify glass appears on display. Trend arrows. Zan functions: target ranges set to ; alerts set if applicable. Bring reader to all office visits. Remove for MRI, CAT scan and X-ray. Call [employer] ([phone]) for problems with sensor, they will replace.     Patient correctly applied sensor to back of upper arm and sensor session started. Patient will benefit from insight using Maycol CGM and learning which meals / snacks cause high sugars. We will also be able to make necessary adjustments to the patient's insulin regimen based on CGM data at next follow up.     Advised patient to call the office if he experiences low blood sugar readings.     Freestyle Maycol 3 personal start and education provided by LETTY Leger, PharmD, BCPS

## 2024-07-23 DIAGNOSIS — E11.9 DIABETES MELLITUS WITHOUT COMPLICATION (MULTI): Primary | ICD-10-CM

## 2024-07-24 ENCOUNTER — CLINICAL SUPPORT (OUTPATIENT)
Dept: PRIMARY CARE | Facility: CLINIC | Age: 84
End: 2024-07-24
Payer: MEDICARE

## 2024-07-24 DIAGNOSIS — E11.9 DIABETES MELLITUS WITHOUT COMPLICATION (MULTI): ICD-10-CM

## 2024-07-24 NOTE — PROGRESS NOTES
"Kyle presents to the office today for a follow up after starting mami 3 earlier this month. Patient states that he cannot get reader to work sometimes, downloaded report and there are no gaps in readings and appears to be working. Discussed how to start a new sensor and how to read sugar readings. Patient states his sugar readings are like a roller coaster. Discussed that patient is on an insulin that is affordable for him, but there are newer insulin that mimic the bodies natural insulin cycle a lot better (reducing the \"roller coaster\" sugar readings) and are a lot more affordable than they used to be. Patient states he will think about this before next visit when we can discuss this more. Patient is still unsure if he likes the mami 3 but he will continue to use until next visit with PCP and we can discuss continued use then. Patient reported low blood sugar readings, discussed signs and symptoms of hypoglycemia and how to treat. Patient has been appropriately treating low sugars. Discussed that if he continues to have multiple low readings a week he needs to call the office and we might need to decrease his insulin dose. Will follow up with patient at next in office visit with PCP in 5 months    Education provided by LETTY Leger, PharmD, BCPS  "

## 2024-08-07 ENCOUNTER — TELEPHONE (OUTPATIENT)
Dept: PRIMARY CARE | Facility: CLINIC | Age: 84
End: 2024-08-07
Payer: MEDICARE

## 2024-08-07 NOTE — TELEPHONE ENCOUNTER
Left voicemail for pt, pt informed that office received paperwork from [facility] requesting medical clearance for catarct surgery, pt needs appt for clearance per PCP.

## 2024-08-08 NOTE — TELEPHONE ENCOUNTER
Left voicemail for [facility] surgery scheduler, Mariajose that pt will need an appointment for medical clearance with Dana Costello CNP or Charleen Cam CNP.  Pt advised to call office to schedule, left voice mail but have not heard back from pt.

## 2024-08-12 ENCOUNTER — OFFICE VISIT (OUTPATIENT)
Dept: PRIMARY CARE | Facility: CLINIC | Age: 84
End: 2024-08-12
Payer: MEDICARE

## 2024-08-12 VITALS
HEART RATE: 76 BPM | SYSTOLIC BLOOD PRESSURE: 136 MMHG | BODY MASS INDEX: 41.14 KG/M2 | TEMPERATURE: 97.5 F | WEIGHT: 256 LBS | OXYGEN SATURATION: 95 % | HEIGHT: 66 IN | DIASTOLIC BLOOD PRESSURE: 84 MMHG

## 2024-08-12 DIAGNOSIS — E11.9 DIABETES MELLITUS WITHOUT COMPLICATION (MULTI): ICD-10-CM

## 2024-08-12 DIAGNOSIS — Z01.818 PREOPERATIVE CLEARANCE: Primary | ICD-10-CM

## 2024-08-12 DIAGNOSIS — I25.10 CORONARY ARTERY DISEASE WITHOUT ANGINA PECTORIS, UNSPECIFIED VESSEL OR LESION TYPE, UNSPECIFIED WHETHER NATIVE OR TRANSPLANTED HEART: ICD-10-CM

## 2024-08-12 DIAGNOSIS — M10.9 GOUT, UNSPECIFIED CAUSE, UNSPECIFIED CHRONICITY, UNSPECIFIED SITE: ICD-10-CM

## 2024-08-12 DIAGNOSIS — I10 PRIMARY HYPERTENSION: ICD-10-CM

## 2024-08-12 DIAGNOSIS — E78.2 MIXED HYPERLIPIDEMIA: ICD-10-CM

## 2024-08-12 DIAGNOSIS — E11.42 DIABETIC POLYNEUROPATHY ASSOCIATED WITH TYPE 2 DIABETES MELLITUS (MULTI): ICD-10-CM

## 2024-08-12 DIAGNOSIS — R60.0 LOWER LEG EDEMA: ICD-10-CM

## 2024-08-12 PROBLEM — E86.0 DEHYDRATION: Status: RESOLVED | Noted: 2023-09-05 | Resolved: 2024-08-12

## 2024-08-12 PROBLEM — R60.9 EDEMA: Status: RESOLVED | Noted: 2023-09-05 | Resolved: 2024-08-12

## 2024-08-12 PROBLEM — E87.1 HYPONATREMIA: Status: RESOLVED | Noted: 2023-09-05 | Resolved: 2024-08-12

## 2024-08-12 PROBLEM — R40.1 CLOUDED CONSCIOUSNESS: Status: RESOLVED | Noted: 2019-08-02 | Resolved: 2024-08-12

## 2024-08-12 PROBLEM — L03.119 CELLULITIS OF LOWER LEG: Status: RESOLVED | Noted: 2023-09-05 | Resolved: 2024-08-12

## 2024-08-12 PROBLEM — R41.0 DELIRIUM: Status: RESOLVED | Noted: 2023-09-05 | Resolved: 2024-08-12

## 2024-08-12 PROBLEM — M79.89 LEG SWELLING: Status: RESOLVED | Noted: 2023-09-05 | Resolved: 2024-08-12

## 2024-08-12 PROBLEM — M79.606 PAIN OF LOWER EXTREMITY: Status: RESOLVED | Noted: 2023-09-05 | Resolved: 2024-08-12

## 2024-08-12 PROBLEM — F41.9 ANXIETY: Status: RESOLVED | Noted: 2023-09-05 | Resolved: 2024-08-12

## 2024-08-12 PROBLEM — A87.9 VIRAL MENINGITIS (HHS-HCC): Status: RESOLVED | Noted: 2023-09-05 | Resolved: 2024-08-12

## 2024-08-12 PROBLEM — L03.90 CELLULITIS: Status: RESOLVED | Noted: 2023-09-05 | Resolved: 2024-08-12

## 2024-08-12 PROBLEM — E87.20 LACTIC ACIDOSIS: Status: RESOLVED | Noted: 2023-09-05 | Resolved: 2024-08-12

## 2024-08-12 PROCEDURE — 3079F DIAST BP 80-89 MM HG: CPT | Performed by: NURSE PRACTITIONER

## 2024-08-12 PROCEDURE — 99214 OFFICE O/P EST MOD 30 MIN: CPT | Performed by: NURSE PRACTITIONER

## 2024-08-12 PROCEDURE — 1126F AMNT PAIN NOTED NONE PRSNT: CPT | Performed by: NURSE PRACTITIONER

## 2024-08-12 PROCEDURE — 1159F MED LIST DOCD IN RCRD: CPT | Performed by: NURSE PRACTITIONER

## 2024-08-12 PROCEDURE — 3075F SYST BP GE 130 - 139MM HG: CPT | Performed by: NURSE PRACTITIONER

## 2024-08-12 PROCEDURE — 1123F ACP DISCUSS/DSCN MKR DOCD: CPT | Performed by: NURSE PRACTITIONER

## 2024-08-12 PROCEDURE — 1160F RVW MEDS BY RX/DR IN RCRD: CPT | Performed by: NURSE PRACTITIONER

## 2024-08-12 PROCEDURE — 1036F TOBACCO NON-USER: CPT | Performed by: NURSE PRACTITIONER

## 2024-08-12 PROCEDURE — 1158F ADVNC CARE PLAN TLK DOCD: CPT | Performed by: NURSE PRACTITIONER

## 2024-08-12 ASSESSMENT — ENCOUNTER SYMPTOMS
EYE DISCHARGE: 0
BRUISES/BLEEDS EASILY: 0
WOUND: 0
FLANK PAIN: 0
CHILLS: 0
DIAPHORESIS: 0
HEMATURIA: 0
POLYPHAGIA: 0
ABDOMINAL PAIN: 0
SPEECH DIFFICULTY: 0
POLYDIPSIA: 0
EYE PAIN: 0
BLOOD IN STOOL: 0
FEVER: 0
VOMITING: 0
HEADACHES: 0
NECK PAIN: 0
BACK PAIN: 0
OCCASIONAL FEELINGS OF UNSTEADINESS: 1
PALPITATIONS: 0
DEPRESSION: 0
DYSURIA: 0
FATIGUE: 0
CHEST TIGHTNESS: 0
SEIZURES: 0
FACIAL ASYMMETRY: 0
LOSS OF SENSATION IN FEET: 0
DIZZINESS: 0
CONFUSION: 0
SHORTNESS OF BREATH: 0
ADENOPATHY: 0
COUGH: 0
NAUSEA: 0
EYE ITCHING: 0
AGITATION: 0

## 2024-08-12 ASSESSMENT — LIFESTYLE VARIABLES
SKIP TO QUESTIONS 9-10: 1
AUDIT-C TOTAL SCORE: 1
SKIP TO QUESTIONS 9-10: 1
HAS A RELATIVE, FRIEND, DOCTOR, OR ANOTHER HEALTH PROFESSIONAL EXPRESSED CONCERN ABOUT YOUR DRINKING OR SUGGESTED YOU CUT DOWN: NO
HOW OFTEN DURING THE LAST YEAR HAVE YOU HAD A FEELING OF GUILT OR REMORSE AFTER DRINKING: NEVER
HOW OFTEN DO YOU HAVE SIX OR MORE DRINKS ON ONE OCCASION: NEVER
AUDIT TOTAL SCORE: 0
HOW OFTEN DURING THE LAST YEAR HAVE YOU NEEDED AN ALCOHOLIC DRINK FIRST THING IN THE MORNING TO GET YOURSELF GOING AFTER A NIGHT OF HEAVY DRINKING: NEVER
HOW OFTEN DURING THE LAST YEAR HAVE YOU FOUND THAT YOU WERE NOT ABLE TO STOP DRINKING ONCE YOU HAD STARTED: NEVER
HOW OFTEN DO YOU HAVE A DRINK CONTAINING ALCOHOL: NEVER
AUDIT-C TOTAL SCORE: 0
AUDIT TOTAL SCORE: 1
HOW OFTEN DO YOU HAVE A DRINK CONTAINING ALCOHOL: MONTHLY OR LESS
HOW OFTEN DO YOU HAVE SIX OR MORE DRINKS ON ONE OCCASION: NEVER
HOW MANY STANDARD DRINKS CONTAINING ALCOHOL DO YOU HAVE ON A TYPICAL DAY: 1 OR 2
HAVE YOU OR SOMEONE ELSE BEEN INJURED AS A RESULT OF YOUR DRINKING: NO
HOW OFTEN DURING THE LAST YEAR HAVE YOU BEEN UNABLE TO REMEMBER WHAT HAPPENED THE NIGHT BEFORE BECAUSE YOU HAD BEEN DRINKING: NEVER
HAS A RELATIVE, FRIEND, DOCTOR, OR ANOTHER HEALTH PROFESSIONAL EXPRESSED CONCERN ABOUT YOUR DRINKING OR SUGGESTED YOU CUT DOWN: NO
HOW OFTEN DURING THE LAST YEAR HAVE YOU NEEDED AN ALCOHOLIC DRINK FIRST THING IN THE MORNING TO GET YOURSELF GOING AFTER A NIGHT OF HEAVY DRINKING: NEVER
HOW OFTEN DURING THE LAST YEAR HAVE YOU FAILED TO DO WHAT WAS NORMALLY EXPECTED FROM YOU BECAUSE OF DRINKING: NEVER
HOW OFTEN DURING THE LAST YEAR HAVE YOU HAD A FEELING OF GUILT OR REMORSE AFTER DRINKING: NEVER
HAVE YOU OR SOMEONE ELSE BEEN INJURED AS A RESULT OF YOUR DRINKING: NO
HOW OFTEN DURING THE LAST YEAR HAVE YOU BEEN UNABLE TO REMEMBER WHAT HAPPENED THE NIGHT BEFORE BECAUSE YOU HAD BEEN DRINKING: NEVER
HOW OFTEN DURING THE LAST YEAR HAVE YOU FAILED TO DO WHAT WAS NORMALLY EXPECTED FROM YOU BECAUSE OF DRINKING: NEVER
HOW MANY STANDARD DRINKS CONTAINING ALCOHOL DO YOU HAVE ON A TYPICAL DAY: PATIENT DOES NOT DRINK
HOW OFTEN DURING THE LAST YEAR HAVE YOU FOUND THAT YOU WERE NOT ABLE TO STOP DRINKING ONCE YOU HAD STARTED: NEVER

## 2024-08-12 ASSESSMENT — PATIENT HEALTH QUESTIONNAIRE - PHQ9
2. FEELING DOWN, DEPRESSED OR HOPELESS: NOT AT ALL
1. LITTLE INTEREST OR PLEASURE IN DOING THINGS: NOT AT ALL
SUM OF ALL RESPONSES TO PHQ9 QUESTIONS 1 AND 2: 0

## 2024-08-12 ASSESSMENT — PAIN SCALES - GENERAL: PAINLEVEL: 0-NO PAIN

## 2024-08-12 NOTE — PROGRESS NOTES
[facility]: [facility]  PROGRESS NOTE      Kyle Conteh is a 84 y.o. male that is presenting today for preoperative medical clearance.    Mr. Conteh is scheduled for cataract removal surgery with Dr. Adrian Farias using MAC anesthesia.  The surgery is scheduled for 09/09/2024 at [facility].  Patient reports his chronic medical conditions are stable and he is without new health complaints.    Assessment/Plan   Diagnoses and all orders for this visit:    Preoperative clearance         -     Mr. Conteh's chronic medical conditions are stable and he is without new health complaints.  He was instructed to hold Plavix for 3 days prior to surgery and resume the day after surgery.  Mr. Conteh is medically cleared for above noted surgery.         Diabetes mellitus without complication (Multi)        -     Stable. Most recent A1c on 06/14/24 was 7.8        -     Continue insulin as prescribed    Primary hypertension        -     acceptable control        -     lisinopril 20 mg daily    Mixed hyperlipidemia        -     tolerating statin        -     simvastatin 80 mg daily    Lower leg edema        -     stable        -     furosemide 20 mg daily    Diabetic polyneuropathy associated with type 2 diabetes mellitus (Multi)        -     gabapentin 600 mg every 8 hours    Gout, unspecified cause, unspecified chronicity, unspecified site        -     no recent flare        -     allopurinol 300 mg daily    Coronary artery disease without angina pectoris, unspecified vessel or lesion type, unspecified whether native or transplanted heart        -     Plavix 75 mg daily        -     Patient instructed to hold Plavix as noted above for surgery.    Subjective   HPI  Review of Systems   Constitutional:  Negative for chills, diaphoresis, fatigue and fever.   HENT:  Negative for hearing loss and mouth sores.    Eyes:  Negative for pain, discharge, itching and visual disturbance.        Wears corrective  lenses   Respiratory:  Negative for cough, chest tightness and shortness of breath.    Cardiovascular:  Positive for leg swelling. Negative for chest pain and palpitations.   Gastrointestinal:  Negative for abdominal pain, blood in stool, nausea and vomiting.   Endocrine: Negative for cold intolerance, heat intolerance, polydipsia, polyphagia and polyuria.   Genitourinary:  Negative for dysuria, flank pain and hematuria.   Musculoskeletal:  Negative for back pain and neck pain.   Skin:  Negative for rash and wound.   Allergic/Immunologic: Negative for environmental allergies, food allergies and immunocompromised state.   Neurological:  Negative for dizziness, seizures, syncope, facial asymmetry, speech difficulty and headaches.   Hematological:  Negative for adenopathy. Does not bruise/bleed easily.   Psychiatric/Behavioral:  Negative for agitation and confusion.       Objective   Vitals:    08/12/24 1508   BP: 136/84   Pulse: 76   Temp: 36.4 °C (97.5 °F)   SpO2: 95%      Body mass index is 41.95 kg/m².  Physical Exam  Vitals and nursing note reviewed.   Constitutional:       General: He is not in acute distress.     Appearance: Normal appearance. He is not ill-appearing.   HENT:      Head: Normocephalic and atraumatic.      Right Ear: Tympanic membrane, ear canal and external ear normal. There is no impacted cerumen.      Left Ear: Tympanic membrane, ear canal and external ear normal. There is no impacted cerumen.      Nose: Nose normal.      Mouth/Throat:      Mouth: Mucous membranes are moist.      Pharynx: Oropharynx is clear. No oropharyngeal exudate or posterior oropharyngeal erythema.   Eyes:      General: No scleral icterus.        Right eye: No discharge.         Left eye: No discharge.      Extraocular Movements: Extraocular movements intact.      Conjunctiva/sclera: Conjunctivae normal.      Pupils: Pupils are equal, round, and reactive to light.   Neck:      Vascular: No carotid bruit.   Cardiovascular:       Rate and Rhythm: Normal rate and regular rhythm.      Pulses: Normal pulses.      Heart sounds: Normal heart sounds. No murmur heard.  Pulmonary:      Effort: Pulmonary effort is normal. No respiratory distress.      Breath sounds: Normal breath sounds.   Abdominal:      General: Abdomen is flat. Bowel sounds are normal. There is no distension.      Palpations: Abdomen is soft. There is no mass.      Tenderness: There is no abdominal tenderness. There is no right CVA tenderness or left CVA tenderness.      Hernia: No hernia is present.   Musculoskeletal:         General: Normal range of motion.      Cervical back: Normal range of motion. No tenderness.      Right lower leg: Edema present.      Left lower leg: Edema present.   Lymphadenopathy:      Cervical: No cervical adenopathy.   Skin:     General: Skin is warm and dry.      Coloration: Skin is not jaundiced.      Findings: No rash.   Neurological:      General: No focal deficit present.      Mental Status: He is alert and oriented to person, place, and time. Mental status is at baseline.   Psychiatric:         Mood and Affect: Mood normal.         Behavior: Behavior normal.       Diagnostic Results   Lab Results   Component Value Date    GLUCOSE 154 (H) 06/14/2024    CALCIUM 8.5 06/14/2024     06/14/2024    K 4.2 06/14/2024    CO2 26 06/14/2024     06/14/2024    BUN 19 06/14/2024    CREATININE 1.30 06/14/2024     Lab Results   Component Value Date    ALT 11 06/14/2024    AST 16 06/14/2024    ALKPHOS 86 06/14/2024    BILITOT 0.8 06/14/2024     Lab Results   Component Value Date    WBC 7.9 06/14/2024    HGB 16.9 06/14/2024    HCT 53.1 (H) 06/14/2024    MCV 96 06/14/2024     06/14/2024     Lab Results   Component Value Date    CHOL 156 06/14/2024    CHOL 167 12/15/2023    CHOL 139 06/12/2023     Lab Results   Component Value Date    HDL 36.0 (L) 06/14/2024    HDL 35.0 (L) 12/15/2023    HDL 30 (L) 06/12/2023     Lab Results   Component Value  "Date    LDLCALC 99 06/14/2024    LDLCALC 100 12/15/2023    LDLCALC 84 06/12/2023     Lab Results   Component Value Date    TRIG 104 06/14/2024    TRIG 158 (H) 12/15/2023    TRIG 123 06/12/2023     No components found for: \"CHOLHDL\"  Lab Results   Component Value Date    HGBA1C 7.8 (H) 06/14/2024     Other labs not included in the list above were reviewed either before or during this encounter.    History    History reviewed. No pertinent past medical history.  History reviewed. No pertinent surgical history.  Family History   Problem Relation Name Age of Onset    Diabetes Mother      Lung cancer Mother      Diabetes Father      Other (OHD) Father      Diabetes Brother       Social History     Socioeconomic History    Marital status:      Spouse name: Not on file    Number of children: Not on file    Years of education: Not on file    Highest education level: Not on file   Occupational History    Not on file   Tobacco Use    Smoking status: Never    Smokeless tobacco: Never   Substance and Sexual Activity    Alcohol use: Yes    Drug use: Never    Sexual activity: Not on file   Other Topics Concern    Not on file   Social History Narrative    Not on file     Social Determinants of Health     Financial Resource Strain: Not on file   Food Insecurity: Not on file   Transportation Needs: Not on file   Physical Activity: Not on file   Stress: Not on file   Social Connections: Not on file   Intimate Partner Violence: Not on file   Housing Stability: Not on file     Allergies   Allergen Reactions    Cheese Nausea/vomiting     Ate as a kid - never had as adult     Current Outpatient Medications on File Prior to Visit   Medication Sig Dispense Refill    allopurinol (Zyloprim) 300 mg tablet Take 1 tablet (300 mg) by mouth once daily. 90 tablet 3    blood sugar diagnostic (Blood Glucose Test) strip 300 strips by subdermal route 3 times a day. One Touch Verio Test Strips DX: E11.9 uses insulin test 3x/day for 90 days 300 " each 3    clopidogrel (Plavix) 75 mg tablet Take 1 tablet (75 mg) by mouth once daily.      FreeStyle Maycol 3 [place] misc Use as instructed with sensors 1 each 0    FreeStyle Maycol 3 Sensor device Use as directed to check blood sugars daily, change every 2 weeks 2 each 11    furosemide (Lasix) 20 mg tablet Take 1 tablet (20 mg) by mouth once daily. 90 tablet 3    gabapentin (Neurontin) 600 mg tablet Take 1 tablet (600 mg) by mouth every 8 hours. 270 tablet 3    insulin NPH and regular human (HumuLIN 70/30 U-100 Insulin) 100 unit/mL (70-30) injection 40/20 Subcutaneous 40 units w/ breakfast; 20 units before dinner      insulin syringe-needle U-100 1 mL 29 gauge syringe Inject 80 Units under the skin 2 times a day as needed.      lisinopril 20 mg tablet Take 1 tablet (20 mg) by mouth once daily. 90 tablet 3    simvastatin (Zocor) 80 mg tablet Take 1 tablet (80 mg) by mouth once daily at bedtime. 90 tablet 3     No current facility-administered medications on file prior to visit.     Immunization History   Administered Date(s) Administered    Influenza, Unspecified 12/14/2016    Moderna SARS-CoV-2 Vaccination 04/07/2021, 05/07/2021, 11/24/2021     Patient's medical history was reviewed and updated either before or during this encounter.       Dana Costello, APRN-CNP

## 2024-08-13 ENCOUNTER — TELEPHONE (OUTPATIENT)
Dept: PRIMARY CARE | Facility: CLINIC | Age: 84
End: 2024-08-13

## 2024-08-13 ENCOUNTER — CLINICAL SUPPORT (OUTPATIENT)
Dept: PRIMARY CARE | Facility: CLINIC | Age: 84
End: 2024-08-13
Payer: MEDICARE

## 2024-08-13 DIAGNOSIS — E11.9 DIABETES MELLITUS WITHOUT COMPLICATION (MULTI): Primary | ICD-10-CM

## 2024-08-13 NOTE — TELEPHONE ENCOUNTER
Left voicemail for pt, pt instructed to stop Plavix 3 days prior to his eye surgery, and restart Plavix 1 day after surgery, advised to call the office if he has questions.

## 2024-08-13 NOTE — PROGRESS NOTES
Patient received the following instructions for Maycol personal start:     Sensor application and start up of sensor; aware to change in 14 days. Products for better adhesion; skin tac and simpatch. Expected differences in sensor glucose and blood glucose; always check with meter if symptoms do not match sensor glucose of if magnify glass appears on display. Trend arrows: target ranges set to ; alerts set if applicable. Bring reader to all office visits. Remove for MRI, CAT scan and X-ray. Call [employer] ([phone]) for problems with sensor, they will replace.     Patient correctly applied sensor to back of upper arm and sensor session started. Patient will benefit from insight using Maycol CGM and learning which meals / snacks cause high sugars. We will also be able to make necessary adjustments to the patient's insulin regimen based on CGM data at next follow up.     Freestyle Maycol 3 personal start and education provided by LETTY Leger, PharmD, BCPS

## 2024-12-19 ENCOUNTER — TELEPHONE (OUTPATIENT)
Dept: PRIMARY CARE | Facility: CLINIC | Age: 84
End: 2024-12-19

## 2024-12-19 ENCOUNTER — LAB (OUTPATIENT)
Dept: LAB | Facility: LAB | Age: 84
End: 2024-12-19
Payer: MEDICARE

## 2024-12-19 DIAGNOSIS — E53.8 VITAMIN B12 DEFICIENCY: ICD-10-CM

## 2024-12-19 DIAGNOSIS — E79.0 HYPERURICEMIA: ICD-10-CM

## 2024-12-19 DIAGNOSIS — I10 PRIMARY HYPERTENSION: ICD-10-CM

## 2024-12-19 DIAGNOSIS — E11.9 DIABETES MELLITUS WITHOUT COMPLICATION (MULTI): ICD-10-CM

## 2024-12-19 DIAGNOSIS — E78.2 MIXED HYPERLIPIDEMIA: ICD-10-CM

## 2024-12-19 DIAGNOSIS — E55.9 VITAMIN D DEFICIENCY: ICD-10-CM

## 2024-12-19 DIAGNOSIS — E11.9 DIABETES MELLITUS WITHOUT COMPLICATION (MULTI): Primary | ICD-10-CM

## 2024-12-19 DIAGNOSIS — M10.9 GOUT, UNSPECIFIED CAUSE, UNSPECIFIED CHRONICITY, UNSPECIFIED SITE: ICD-10-CM

## 2024-12-19 DIAGNOSIS — R73.9 HYPERGLYCEMIA: ICD-10-CM

## 2024-12-19 LAB
25(OH)D3 SERPL-MCNC: 14 NG/ML (ref 30–100)
ALBUMIN SERPL BCP-MCNC: 3.9 G/DL (ref 3.4–5)
ALP SERPL-CCNC: 68 U/L (ref 33–136)
ALT SERPL W P-5'-P-CCNC: 12 U/L (ref 10–52)
ANION GAP SERPL CALCULATED.3IONS-SCNC: 12 MMOL/L (ref 10–20)
AST SERPL W P-5'-P-CCNC: 17 U/L (ref 9–39)
BASOPHILS # BLD AUTO: 0.03 X10*3/UL (ref 0–0.1)
BASOPHILS NFR BLD AUTO: 0.4 %
BILIRUB SERPL-MCNC: 0.7 MG/DL (ref 0–1.2)
BUN SERPL-MCNC: 15 MG/DL (ref 6–23)
CALCIUM SERPL-MCNC: 8.5 MG/DL (ref 8.6–10.3)
CHLORIDE SERPL-SCNC: 102 MMOL/L (ref 98–107)
CHOLEST SERPL-MCNC: 139 MG/DL (ref 0–199)
CHOLEST/HDLC SERPL: 4.1 {RATIO}
CO2 SERPL-SCNC: 31 MMOL/L (ref 21–32)
CREAT SERPL-MCNC: 1.11 MG/DL (ref 0.5–1.3)
EGFRCR SERPLBLD CKD-EPI 2021: 66 ML/MIN/1.73M*2
EOSINOPHIL # BLD AUTO: 0.15 X10*3/UL (ref 0–0.4)
EOSINOPHIL NFR BLD AUTO: 1.9 %
ERYTHROCYTE [DISTWIDTH] IN BLOOD BY AUTOMATED COUNT: 14 % (ref 11.5–14.5)
EST. AVERAGE GLUCOSE BLD GHB EST-MCNC: 192 MG/DL
GLUCOSE SERPL-MCNC: 145 MG/DL (ref 74–99)
HBA1C MFR BLD: 8.3 %
HCT VFR BLD AUTO: 52.2 % (ref 41–52)
HDLC SERPL-MCNC: 33.9 MG/DL
HGB BLD-MCNC: 16.4 G/DL (ref 13.5–17.5)
IMM GRANULOCYTES # BLD AUTO: 0.03 X10*3/UL (ref 0–0.5)
IMM GRANULOCYTES NFR BLD AUTO: 0.4 % (ref 0–0.9)
LDLC SERPL CALC-MCNC: 78 MG/DL
LYMPHOCYTES # BLD AUTO: 1.46 X10*3/UL (ref 0.8–3)
LYMPHOCYTES NFR BLD AUTO: 18.8 %
MCH RBC QN AUTO: 30.9 PG (ref 26–34)
MCHC RBC AUTO-ENTMCNC: 31.4 G/DL (ref 32–36)
MCV RBC AUTO: 98 FL (ref 80–100)
MONOCYTES # BLD AUTO: 0.49 X10*3/UL (ref 0.05–0.8)
MONOCYTES NFR BLD AUTO: 6.3 %
NEUTROPHILS # BLD AUTO: 5.6 X10*3/UL (ref 1.6–5.5)
NEUTROPHILS NFR BLD AUTO: 72.2 %
NON HDL CHOLESTEROL: 105 MG/DL (ref 0–149)
NRBC BLD-RTO: 0 /100 WBCS (ref 0–0)
PLATELET # BLD AUTO: 207 X10*3/UL (ref 150–450)
POTASSIUM SERPL-SCNC: 4.7 MMOL/L (ref 3.5–5.3)
PROT SERPL-MCNC: 6.8 G/DL (ref 6.4–8.2)
RBC # BLD AUTO: 5.31 X10*6/UL (ref 4.5–5.9)
SODIUM SERPL-SCNC: 140 MMOL/L (ref 136–145)
TRIGL SERPL-MCNC: 137 MG/DL (ref 0–149)
URATE SERPL-MCNC: 4.8 MG/DL (ref 4–7.5)
VLDL: 27 MG/DL (ref 0–40)
WBC # BLD AUTO: 7.8 X10*3/UL (ref 4.4–11.3)

## 2024-12-19 PROCEDURE — 80053 COMPREHEN METABOLIC PANEL: CPT

## 2024-12-19 PROCEDURE — 85025 COMPLETE CBC W/AUTO DIFF WBC: CPT

## 2024-12-19 PROCEDURE — 84550 ASSAY OF BLOOD/URIC ACID: CPT

## 2024-12-19 PROCEDURE — 82306 VITAMIN D 25 HYDROXY: CPT

## 2024-12-19 PROCEDURE — 36415 COLL VENOUS BLD VENIPUNCTURE: CPT

## 2024-12-19 PROCEDURE — 83036 HEMOGLOBIN GLYCOSYLATED A1C: CPT

## 2024-12-19 PROCEDURE — 80061 LIPID PANEL: CPT

## 2024-12-24 ENCOUNTER — OFFICE VISIT (OUTPATIENT)
Dept: PRIMARY CARE | Facility: CLINIC | Age: 84
End: 2024-12-24
Payer: MEDICARE

## 2024-12-24 VITALS
WEIGHT: 259 LBS | TEMPERATURE: 97.3 F | DIASTOLIC BLOOD PRESSURE: 72 MMHG | BODY MASS INDEX: 41.62 KG/M2 | SYSTOLIC BLOOD PRESSURE: 124 MMHG | HEIGHT: 66 IN

## 2024-12-24 DIAGNOSIS — I25.10 CORONARY ARTERY DISEASE WITHOUT ANGINA PECTORIS, UNSPECIFIED VESSEL OR LESION TYPE, UNSPECIFIED WHETHER NATIVE OR TRANSPLANTED HEART: ICD-10-CM

## 2024-12-24 DIAGNOSIS — R60.0 LOWER LEG EDEMA: ICD-10-CM

## 2024-12-24 DIAGNOSIS — E78.2 MIXED HYPERLIPIDEMIA: ICD-10-CM

## 2024-12-24 DIAGNOSIS — Z79.4 TYPE 2 DIABETES MELLITUS WITH DIABETIC NEPHROPATHY, WITH LONG-TERM CURRENT USE OF INSULIN: Primary | ICD-10-CM

## 2024-12-24 DIAGNOSIS — I10 PRIMARY HYPERTENSION: ICD-10-CM

## 2024-12-24 DIAGNOSIS — E11.42 DIABETIC POLYNEUROPATHY ASSOCIATED WITH TYPE 2 DIABETES MELLITUS (MULTI): ICD-10-CM

## 2024-12-24 DIAGNOSIS — M10.9 GOUT, UNSPECIFIED CAUSE, UNSPECIFIED CHRONICITY, UNSPECIFIED SITE: ICD-10-CM

## 2024-12-24 DIAGNOSIS — E11.21 TYPE 2 DIABETES MELLITUS WITH DIABETIC NEPHROPATHY, WITH LONG-TERM CURRENT USE OF INSULIN: Primary | ICD-10-CM

## 2024-12-24 DIAGNOSIS — E55.9 VITAMIN D DEFICIENCY: ICD-10-CM

## 2024-12-24 PROCEDURE — 99214 OFFICE O/P EST MOD 30 MIN: CPT | Performed by: NURSE PRACTITIONER

## 2024-12-24 PROCEDURE — 3078F DIAST BP <80 MM HG: CPT | Performed by: NURSE PRACTITIONER

## 2024-12-24 PROCEDURE — 3074F SYST BP LT 130 MM HG: CPT | Performed by: NURSE PRACTITIONER

## 2024-12-24 PROCEDURE — 1126F AMNT PAIN NOTED NONE PRSNT: CPT | Performed by: NURSE PRACTITIONER

## 2024-12-24 PROCEDURE — 1160F RVW MEDS BY RX/DR IN RCRD: CPT | Performed by: NURSE PRACTITIONER

## 2024-12-24 PROCEDURE — 1123F ACP DISCUSS/DSCN MKR DOCD: CPT | Performed by: NURSE PRACTITIONER

## 2024-12-24 PROCEDURE — 1036F TOBACCO NON-USER: CPT | Performed by: NURSE PRACTITIONER

## 2024-12-24 PROCEDURE — 1159F MED LIST DOCD IN RCRD: CPT | Performed by: NURSE PRACTITIONER

## 2024-12-24 RX ORDER — ALLOPURINOL 300 MG/1
300 TABLET ORAL DAILY
Qty: 90 TABLET | Refills: 3 | Status: SHIPPED | OUTPATIENT
Start: 2024-12-24

## 2024-12-24 RX ORDER — LISINOPRIL 20 MG/1
20 TABLET ORAL DAILY
COMMUNITY
End: 2024-12-24 | Stop reason: SDUPTHER

## 2024-12-24 RX ORDER — GABAPENTIN 600 MG/1
600 TABLET ORAL 2 TIMES DAILY
Qty: 180 TABLET | Refills: 3 | Status: SHIPPED | OUTPATIENT
Start: 2024-12-24

## 2024-12-24 RX ORDER — GABAPENTIN 600 MG/1
600 TABLET ORAL 2 TIMES DAILY
COMMUNITY
End: 2024-12-24 | Stop reason: SDUPTHER

## 2024-12-24 RX ORDER — GABAPENTIN 600 MG/1
600 TABLET ORAL 2 TIMES DAILY
Qty: 180 TABLET | Refills: 3 | Status: SHIPPED | OUTPATIENT
Start: 2024-12-24 | End: 2024-12-24 | Stop reason: SDUPTHER

## 2024-12-24 RX ORDER — CLOPIDOGREL BISULFATE 75 MG/1
75 TABLET ORAL DAILY
Qty: 90 TABLET | Refills: 3 | Status: SHIPPED | OUTPATIENT
Start: 2024-12-24

## 2024-12-24 RX ORDER — SIMVASTATIN 80 MG/1
80 TABLET, FILM COATED ORAL NIGHTLY
Qty: 90 TABLET | Refills: 3 | Status: SHIPPED | OUTPATIENT
Start: 2024-12-24 | End: 2025-12-24

## 2024-12-24 RX ORDER — LISINOPRIL 20 MG/1
20 TABLET ORAL DAILY
Qty: 90 TABLET | Refills: 3 | Status: SHIPPED | OUTPATIENT
Start: 2024-12-24

## 2024-12-24 RX ORDER — FUROSEMIDE 20 MG/1
20 TABLET ORAL DAILY
COMMUNITY
End: 2024-12-24 | Stop reason: SDUPTHER

## 2024-12-24 RX ORDER — ALLOPURINOL 300 MG/1
300 TABLET ORAL DAILY
COMMUNITY
End: 2024-12-24 | Stop reason: SDUPTHER

## 2024-12-24 RX ORDER — FUROSEMIDE 20 MG/1
20 TABLET ORAL DAILY
Qty: 90 TABLET | Refills: 3 | Status: SHIPPED | OUTPATIENT
Start: 2024-12-24

## 2024-12-24 ASSESSMENT — ENCOUNTER SYMPTOMS
PALPITATIONS: 0
HEADACHES: 0
COUGH: 0
OCCASIONAL FEELINGS OF UNSTEADINESS: 1
DIAPHORESIS: 0
DEPRESSION: 0
CHILLS: 0
FATIGUE: 0
CHEST TIGHTNESS: 0
ABDOMINAL PAIN: 0
DIZZINESS: 0
SHORTNESS OF BREATH: 0
VOMITING: 0
LOSS OF SENSATION IN FEET: 0
BLOOD IN STOOL: 0
NAUSEA: 0
NUMBNESS: 1
FEVER: 0

## 2024-12-24 ASSESSMENT — LIFESTYLE VARIABLES
HOW OFTEN DURING THE LAST YEAR HAVE YOU NEEDED AN ALCOHOLIC DRINK FIRST THING IN THE MORNING TO GET YOURSELF GOING AFTER A NIGHT OF HEAVY DRINKING: NEVER
HOW MANY STANDARD DRINKS CONTAINING ALCOHOL DO YOU HAVE ON A TYPICAL DAY: PATIENT DOES NOT DRINK
HOW OFTEN DO YOU HAVE SIX OR MORE DRINKS ON ONE OCCASION: NEVER
HOW OFTEN DURING THE LAST YEAR HAVE YOU FOUND THAT YOU WERE NOT ABLE TO STOP DRINKING ONCE YOU HAD STARTED: NEVER
HOW OFTEN DO YOU HAVE A DRINK CONTAINING ALCOHOL: NEVER
AUDIT TOTAL SCORE: 0
SKIP TO QUESTIONS 9-10: 1
HAVE YOU OR SOMEONE ELSE BEEN INJURED AS A RESULT OF YOUR DRINKING: NO
HAS A RELATIVE, FRIEND, DOCTOR, OR ANOTHER HEALTH PROFESSIONAL EXPRESSED CONCERN ABOUT YOUR DRINKING OR SUGGESTED YOU CUT DOWN: NO
HOW OFTEN DURING THE LAST YEAR HAVE YOU FAILED TO DO WHAT WAS NORMALLY EXPECTED FROM YOU BECAUSE OF DRINKING: NEVER
HOW OFTEN DURING THE LAST YEAR HAVE YOU HAD A FEELING OF GUILT OR REMORSE AFTER DRINKING: NEVER
AUDIT-C TOTAL SCORE: 0
HOW OFTEN DURING THE LAST YEAR HAVE YOU BEEN UNABLE TO REMEMBER WHAT HAPPENED THE NIGHT BEFORE BECAUSE YOU HAD BEEN DRINKING: NEVER

## 2024-12-24 ASSESSMENT — PAIN SCALES - GENERAL: PAINLEVEL_OUTOF10: 0-NO PAIN

## 2024-12-24 NOTE — PROGRESS NOTES
[facility]: [facility]  PROGRESS NOTE      Kyle Conteh is a 84 y.o. male that is presenting today for routine 6 month follow up for multiple medica issues.    Advised annual influenza immunization today. Encouraged Covid 19 and RSV immunizations from local pharmacy.    Mr. Conteh reports he is taking his diabetic medications as directed. He is trying to follow a low sugar and carbohydrate diet. He is not monitoring home BG levels with CGM as he is unable to get it to work  He uses finger sticks occasionally. Reports episodes of hyper or hypoglycemia , Denies polydipsia, polyuria or polyphagia. A1c dated 12/19/24 was 8.3, slightly worsening from previous A1c of 7.8.    He reports taking antihypertensive as directed. He is trying to follow a low sodium diet. He is not monitoring home BP. Denies CP, SOB, dizziness, syncope or HA.    He is tolerating statin. He is trying to follow a low cholesterol diet. Denies statin related abdominal pain , myalgias or arthralgias. LDL on 12/19/24 almost at goal at 78.    Lower extremity edema is managed with daily furosemide. He is non-compliant with compression stockings.  At his last appointment, he reported he was to schedule with new vascular specialist.  He states he did but does not recall name.    Denies gout flares since last seen. Reports taking allopurinol as directed. Uric acid dated 12/19/24 WNL.    Vitamin D is low at 24. Recommend taking an OTC Vitamin D supplement.    Gabapentin is taken 3x/day to manage neuropathy. Mr. Conteh reports it is effective and is not requesting dose increase.  He was verbalizing back and forth whether he is taking it once daily or 3x/day.  After confusing his dose with Allopurinol, which is once daily, he admitted to taking gabapentin 3x/day - and does not want dose adjustment.  He was counseled on the importance of medication dose accuracy to avoid over or underdosing.    Assessment/Plan     Diagnoses and all  orders for this visit:    Type 2 diabetes mellitus with diabetic nephropathy, with long-term current use of insulin        -     Worsening, Encouraged increased lifestyle modification efforts.        -     insulin U-100 80 units twice daily        -     encouraged to try to learn to use CGM    Diabetic polyneuropathy associated with type 2 diabetes mellitus (Multi)  -     gabapentin (Neurontin) 600 mg tablet; Take 1 tablet (600 mg) by mouth 2 times a day.    Primary hypertension  -     excellent control  -     lisinopril 20 mg tablet; Take 1 tablet (20 mg) by mouth once daily.    Mixed hyperlipidemia  -     tolerating statin  -     simvastatin (Zocor) 80 mg tablet; Take 1 tablet (80 mg) by mouth once daily at bedtime.    Gout, unspecified cause, unspecified chronicity, unspecified site  -     Uric acid WNL, no recent gout flare  -     allopurinol (Zyloprim) 300 mg tablet; Take 1 tablet (300 mg) by mouth once daily.    Lower leg edema  -     furosemide (Lasix) 20 mg tablet; Take 1 tablet (20 mg) by mouth once daily.    Vitamin D deficiency        -     Encouraged daily OTC Vitamin D supplement of at least 4,000IU    Coronary artery disease without angina pectoris, unspecified vessel or lesion type, unspecified whether native or transplanted heart  -     clopidogrel (Plavix) 75 mg tablet; Take 1 tablet (75 mg) by mouth once daily.    Other orders  -     Follow Up In Primary Care - Health Maintenance    Subjective   HPI  Review of Systems   Constitutional:  Negative for chills, diaphoresis, fatigue and fever.   Respiratory:  Negative for cough, chest tightness and shortness of breath.    Cardiovascular:  Positive for leg swelling. Negative for chest pain and palpitations.   Gastrointestinal:  Negative for abdominal pain, blood in stool, nausea and vomiting.   Neurological:  Positive for numbness. Negative for dizziness, syncope and headaches.      Objective   Vitals:    12/24/24 0847   BP: 124/72   Temp: 36.3 °C (97.3  "°F)      Body mass index is 42.44 kg/m².  Physical Exam  Constitutional:       General: He is not in acute distress.     Appearance: He is obese.   Neck:      Vascular: No carotid bruit.   Cardiovascular:      Rate and Rhythm: Normal rate and regular rhythm.      Heart sounds: Normal heart sounds. No murmur heard.  Pulmonary:      Effort: Pulmonary effort is normal.      Breath sounds: Normal breath sounds.   Abdominal:      Palpations: Abdomen is soft.      Tenderness: There is no abdominal tenderness. There is no right CVA tenderness or left CVA tenderness.   Skin:     General: Skin is warm and dry.   Neurological:      General: No focal deficit present.      Mental Status: He is alert. Mental status is at baseline.   Psychiatric:         Mood and Affect: Mood normal.       Diagnostic Results   Lab Results   Component Value Date    GLUCOSE 145 (H) 12/19/2024    CALCIUM 8.5 (L) 12/19/2024     12/19/2024    K 4.7 12/19/2024    CO2 31 12/19/2024     12/19/2024    BUN 15 12/19/2024    CREATININE 1.11 12/19/2024     Lab Results   Component Value Date    ALT 12 12/19/2024    AST 17 12/19/2024    ALKPHOS 68 12/19/2024    BILITOT 0.7 12/19/2024     Lab Results   Component Value Date    WBC 7.8 12/19/2024    HGB 16.4 12/19/2024    HCT 52.2 (H) 12/19/2024    MCV 98 12/19/2024     12/19/2024     Lab Results   Component Value Date    CHOL 139 12/19/2024    CHOL 156 06/14/2024    CHOL 167 12/15/2023     Lab Results   Component Value Date    HDL 33.9 12/19/2024    HDL 36.0 (L) 06/14/2024    HDL 35.0 (L) 12/15/2023     Lab Results   Component Value Date    LDLCALC 78 12/19/2024    LDLCALC 99 06/14/2024    LDLCALC 100 12/15/2023     Lab Results   Component Value Date    TRIG 137 12/19/2024    TRIG 104 06/14/2024    TRIG 158 (H) 12/15/2023     No components found for: \"CHOLHDL\"  Lab Results   Component Value Date    HGBA1C 8.3 (H) 12/19/2024     Other labs not included in the list above were reviewed either " before or during this encounter.    History    History reviewed. No pertinent past medical history.  History reviewed. No pertinent surgical history.  Family History   Problem Relation Name Age of Onset    Diabetes Mother      Lung cancer Mother      Diabetes Father      Other (OHD) Father      Diabetes Brother       Social History     Socioeconomic History    Marital status:      Spouse name: Not on file    Number of children: Not on file    Years of education: Not on file    Highest education level: Not on file   Occupational History    Not on file   Tobacco Use    Smoking status: Never    Smokeless tobacco: Never   Substance and Sexual Activity    Alcohol use: Not Currently    Drug use: Never    Sexual activity: Not on file   Other Topics Concern    Not on file   Social History Narrative    Not on file     Social Drivers of Health     Financial Resource Strain: Not on file   Food Insecurity: Not on file   Transportation Needs: Not on file   Physical Activity: Not on file   Stress: Not on file   Social Connections: Not on file   Intimate Partner Violence: Not on file   Housing Stability: Not on file     Allergies   Allergen Reactions    Cheese Nausea/vomiting     Ate as a kid - never had as adult     Current Outpatient Medications on File Prior to Visit   Medication Sig Dispense Refill    allopurinol (Zyloprim) 300 mg tablet Take 1 tablet (300 mg) by mouth once daily.      clopidogrel (Plavix) 75 mg tablet Take 1 tablet (75 mg) by mouth once daily.      FreeStyle Maycol 3 [place] misc Use as instructed with sensors 1 each 0    FreeStyle Maycol 3 Sensor device Use as directed to check blood sugars daily, change every 2 weeks 2 each 11    furosemide (Lasix) 20 mg tablet Take 1 tablet (20 mg) by mouth once daily.      gabapentin (Neurontin) 600 mg tablet Take 1 tablet (600 mg) by mouth every 8 hours.      lisinopril 20 mg tablet Take 1 tablet (20 mg) by mouth once daily.      simvastatin (Zocor) 80 mg tablet Take  1 tablet (80 mg) by mouth once daily at bedtime. 90 tablet 3    UNABLE TO FIND One touch verio test strips      [] allopurinol (Zyloprim) 300 mg tablet Take 1 tablet (300 mg) by mouth once daily. 90 tablet 3    [] blood sugar diagnostic (Blood Glucose Test) strip 300 strips by subdermal route 3 times a day. One Touch Verio Test Strips DX: E11.9 uses insulin test 3x/day for 90 days 300 each 3    furosemide (Lasix) 20 mg tablet Take 1 tablet (20 mg) by mouth once daily. 90 tablet 3    gabapentin (Neurontin) 600 mg tablet Take 1 tablet (600 mg) by mouth every 8 hours. 270 tablet 3    insulin NPH and regular human (HumuLIN 70/30 U-100 Insulin) 100 unit/mL (70-30) injection 40/20 Subcutaneous 40 units w/ breakfast; 20 units before dinner      insulin syringe-needle U-100 1 mL 29 gauge syringe Inject 80 Units under the skin 2 times a day as needed.      lisinopril 20 mg tablet Take 1 tablet (20 mg) by mouth once daily. 90 tablet 3     No current facility-administered medications on file prior to visit.     Immunization History   Administered Date(s) Administered    Influenza, Unspecified 2016    Moderna SARS-CoV-2 Vaccination 2021, 2021, 2021     Patient's medical history was reviewed and updated either before or during this encounter.       Dana Costello, APRN-CNP

## 2024-12-29 DIAGNOSIS — Z79.4 TYPE 2 DIABETES MELLITUS WITH DIABETIC NEPHROPATHY, WITH LONG-TERM CURRENT USE OF INSULIN: Primary | ICD-10-CM

## 2024-12-29 DIAGNOSIS — E11.21 TYPE 2 DIABETES MELLITUS WITH DIABETIC NEPHROPATHY, WITH LONG-TERM CURRENT USE OF INSULIN: Primary | ICD-10-CM

## 2024-12-30 RX ORDER — BLOOD-GLUCOSE METER
EACH MISCELLANEOUS
Qty: 300 EACH | Refills: 3 | Status: SHIPPED | OUTPATIENT
Start: 2024-12-30

## 2025-01-16 DIAGNOSIS — E11.42 DIABETIC POLYNEUROPATHY ASSOCIATED WITH TYPE 2 DIABETES MELLITUS (MULTI): ICD-10-CM

## 2025-01-16 RX ORDER — GABAPENTIN 600 MG/1
600 TABLET ORAL EVERY 8 HOURS
Qty: 270 TABLET | Refills: 3 | Status: SHIPPED | OUTPATIENT
Start: 2025-01-16

## 2025-04-30 ENCOUNTER — TELEPHONE (OUTPATIENT)
Dept: PHARMACY | Facility: HOSPITAL | Age: 85
End: 2025-04-30
Payer: MEDICARE

## 2025-04-30 DIAGNOSIS — Z79.4 TYPE 2 DIABETES MELLITUS WITH DIABETIC NEPHROPATHY, WITH LONG-TERM CURRENT USE OF INSULIN: Primary | ICD-10-CM

## 2025-04-30 DIAGNOSIS — E11.21 TYPE 2 DIABETES MELLITUS WITH DIABETIC NEPHROPATHY, WITH LONG-TERM CURRENT USE OF INSULIN: Primary | ICD-10-CM

## 2025-04-30 RX ORDER — BLOOD-GLUCOSE CONTROL, NORMAL
1 EACH MISCELLANEOUS 3 TIMES DAILY
Qty: 300 EACH | Refills: 3 | Status: SHIPPED | OUTPATIENT
Start: 2025-04-30

## 2025-04-30 RX ORDER — LANCETS 26 GAUGE
EACH MISCELLANEOUS
Qty: 1 EACH | Refills: 0 | Status: SHIPPED | OUTPATIENT
Start: 2025-04-30 | End: 2026-04-30

## 2025-06-24 ENCOUNTER — APPOINTMENT (OUTPATIENT)
Dept: PRIMARY CARE | Facility: CLINIC | Age: 85
End: 2025-06-24
Payer: MEDICARE

## 2025-08-11 ENCOUNTER — TELEPHONE (OUTPATIENT)
Dept: PRIMARY CARE | Facility: CLINIC | Age: 85
End: 2025-08-11
Payer: MEDICARE

## 2025-08-11 ENCOUNTER — PATIENT OUTREACH (OUTPATIENT)
Dept: PRIMARY CARE | Facility: CLINIC | Age: 85
End: 2025-08-11
Payer: MEDICARE

## 2025-08-26 ENCOUNTER — PATIENT OUTREACH (OUTPATIENT)
Dept: PRIMARY CARE | Facility: CLINIC | Age: 85
End: 2025-08-26
Payer: MEDICARE